# Patient Record
Sex: FEMALE | Race: BLACK OR AFRICAN AMERICAN | NOT HISPANIC OR LATINO | URBAN - METROPOLITAN AREA
[De-identification: names, ages, dates, MRNs, and addresses within clinical notes are randomized per-mention and may not be internally consistent; named-entity substitution may affect disease eponyms.]

---

## 2022-09-10 ENCOUNTER — INPATIENT (INPATIENT)
Facility: HOSPITAL | Age: 57
LOS: 2 days | Discharge: HOME | End: 2022-09-13
Attending: INTERNAL MEDICINE | Admitting: INTERNAL MEDICINE

## 2022-09-10 VITALS
TEMPERATURE: 98 F | SYSTOLIC BLOOD PRESSURE: 194 MMHG | WEIGHT: 110.01 LBS | HEIGHT: 62 IN | DIASTOLIC BLOOD PRESSURE: 111 MMHG | RESPIRATION RATE: 16 BRPM | HEART RATE: 136 BPM | OXYGEN SATURATION: 98 %

## 2022-09-10 DIAGNOSIS — E87.70 FLUID OVERLOAD, UNSPECIFIED: ICD-10-CM

## 2022-09-10 DIAGNOSIS — Z98.890 OTHER SPECIFIED POSTPROCEDURAL STATES: ICD-10-CM

## 2022-09-10 DIAGNOSIS — Z91.15 PATIENT'S NONCOMPLIANCE WITH RENAL DIALYSIS: ICD-10-CM

## 2022-09-10 DIAGNOSIS — Z88.8 ALLERGY STATUS TO OTHER DRUGS, MEDICAMENTS AND BIOLOGICAL SUBSTANCES STATUS: ICD-10-CM

## 2022-09-10 LAB
ALBUMIN SERPL ELPH-MCNC: 2.9 G/DL — LOW (ref 3.3–5)
ALP SERPL-CCNC: 180 U/L — HIGH (ref 40–120)
ALT FLD-CCNC: 10 U/L — LOW (ref 12–78)
ANION GAP SERPL CALC-SCNC: 14 MMOL/L — SIGNIFICANT CHANGE UP (ref 5–17)
APTT BLD: 26.9 SEC — LOW (ref 27.5–35.5)
AST SERPL-CCNC: 78 U/L — HIGH (ref 15–37)
BASE EXCESS BLDA CALC-SCNC: -7.9 MMOL/L — LOW (ref -2–3)
BASOPHILS # BLD AUTO: 0.05 K/UL — SIGNIFICANT CHANGE UP (ref 0–0.2)
BASOPHILS NFR BLD AUTO: 0.5 % — SIGNIFICANT CHANGE UP (ref 0–2)
BILIRUB SERPL-MCNC: 0.8 MG/DL — SIGNIFICANT CHANGE UP (ref 0.2–1.2)
BLOOD GAS COMMENTS ARTERIAL: SIGNIFICANT CHANGE UP
BUN SERPL-MCNC: 60 MG/DL — HIGH (ref 7–23)
CALCIUM SERPL-MCNC: 7.7 MG/DL — LOW (ref 8.5–10.1)
CHLORIDE SERPL-SCNC: 110 MMOL/L — HIGH (ref 96–108)
CK MB BLD-MCNC: 3.8 % — HIGH (ref 0–3.5)
CK MB CFR SERPL CALC: 2.9 NG/ML — SIGNIFICANT CHANGE UP (ref 0.5–3.6)
CK SERPL-CCNC: 77 U/L — SIGNIFICANT CHANGE UP (ref 26–192)
CO2 BLDA-SCNC: 18 MMOL/L — LOW (ref 19–24)
CO2 SERPL-SCNC: 15 MMOL/L — LOW (ref 22–31)
CREAT SERPL-MCNC: 3.95 MG/DL — HIGH (ref 0.5–1.3)
EGFR: 12 ML/MIN/1.73M2 — LOW
EOSINOPHIL # BLD AUTO: 0.17 K/UL — SIGNIFICANT CHANGE UP (ref 0–0.5)
EOSINOPHIL NFR BLD AUTO: 1.8 % — SIGNIFICANT CHANGE UP (ref 0–6)
FLUAV AG NPH QL: SIGNIFICANT CHANGE UP
FLUBV AG NPH QL: SIGNIFICANT CHANGE UP
GAS PNL BLDA: SIGNIFICANT CHANGE UP
GLUCOSE SERPL-MCNC: 274 MG/DL — HIGH (ref 70–99)
HCO3 BLDA-SCNC: 17 MMOL/L — LOW (ref 21–28)
HCT VFR BLD CALC: 41 % — SIGNIFICANT CHANGE UP (ref 34.5–45)
HGB BLD-MCNC: 12.7 G/DL — SIGNIFICANT CHANGE UP (ref 11.5–15.5)
HOROWITZ INDEX BLDA+IHG-RTO: 0.5 — SIGNIFICANT CHANGE UP
IMM GRANULOCYTES NFR BLD AUTO: 1.9 % — HIGH (ref 0–1.5)
INR BLD: 1 RATIO — SIGNIFICANT CHANGE UP (ref 0.88–1.16)
LYMPHOCYTES # BLD AUTO: 2.11 K/UL — SIGNIFICANT CHANGE UP (ref 1–3.3)
LYMPHOCYTES # BLD AUTO: 22.1 % — SIGNIFICANT CHANGE UP (ref 13–44)
MAGNESIUM SERPL-MCNC: 2.5 MG/DL — SIGNIFICANT CHANGE UP (ref 1.6–2.6)
MCHC RBC-ENTMCNC: 31 G/DL — LOW (ref 32–36)
MCHC RBC-ENTMCNC: 35.7 PG — HIGH (ref 27–34)
MCV RBC AUTO: 115.2 FL — HIGH (ref 80–100)
MONOCYTES # BLD AUTO: 0.38 K/UL — SIGNIFICANT CHANGE UP (ref 0–0.9)
MONOCYTES NFR BLD AUTO: 4 % — SIGNIFICANT CHANGE UP (ref 2–14)
NEUTROPHILS # BLD AUTO: 6.64 K/UL — SIGNIFICANT CHANGE UP (ref 1.8–7.4)
NEUTROPHILS NFR BLD AUTO: 69.7 % — SIGNIFICANT CHANGE UP (ref 43–77)
NRBC # BLD: 0 /100 WBCS — SIGNIFICANT CHANGE UP (ref 0–0)
NT-PROBNP SERPL-SCNC: HIGH PG/ML (ref 0–125)
PCO2 BLDA: 31 MMHG — LOW (ref 32–46)
PH BLDA: 7.34 — LOW (ref 7.35–7.45)
PLATELET # BLD AUTO: 126 K/UL — LOW (ref 150–400)
PO2 BLDA: 122 MMHG — HIGH (ref 83–108)
POTASSIUM SERPL-MCNC: 4.4 MMOL/L — SIGNIFICANT CHANGE UP (ref 3.5–5.3)
POTASSIUM SERPL-SCNC: 4.4 MMOL/L — SIGNIFICANT CHANGE UP (ref 3.5–5.3)
PROT SERPL-MCNC: 7.4 GM/DL — SIGNIFICANT CHANGE UP (ref 6–8.3)
PROTHROM AB SERPL-ACNC: 11.9 SEC — SIGNIFICANT CHANGE UP (ref 10.5–13.4)
RBC # BLD: 3.56 M/UL — LOW (ref 3.8–5.2)
RBC # FLD: 16.9 % — HIGH (ref 10.3–14.5)
SAO2 % BLDA: 99.4 % — HIGH (ref 94–98)
SARS-COV-2 RNA SPEC QL NAA+PROBE: SIGNIFICANT CHANGE UP
SODIUM SERPL-SCNC: 139 MMOL/L — SIGNIFICANT CHANGE UP (ref 135–145)
TROPONIN I, HIGH SENSITIVITY RESULT: 69 NG/L — HIGH
WBC # BLD: 9.53 K/UL — SIGNIFICANT CHANGE UP (ref 3.8–10.5)
WBC # FLD AUTO: 9.53 K/UL — SIGNIFICANT CHANGE UP (ref 3.8–10.5)

## 2022-09-10 PROCEDURE — 99285 EMERGENCY DEPT VISIT HI MDM: CPT | Mod: FS,CS

## 2022-09-10 PROCEDURE — 93010 ELECTROCARDIOGRAM REPORT: CPT | Mod: 76

## 2022-09-10 PROCEDURE — 71045 X-RAY EXAM CHEST 1 VIEW: CPT | Mod: 26

## 2022-09-10 PROCEDURE — 99291 CRITICAL CARE FIRST HOUR: CPT | Mod: FS

## 2022-09-10 RX ORDER — HYDROMORPHONE HYDROCHLORIDE 2 MG/ML
0.25 INJECTION INTRAMUSCULAR; INTRAVENOUS; SUBCUTANEOUS ONCE
Refills: 0 | Status: DISCONTINUED | OUTPATIENT
Start: 2022-09-10 | End: 2022-09-11

## 2022-09-10 RX ORDER — CHLORHEXIDINE GLUCONATE 213 G/1000ML
1 SOLUTION TOPICAL
Refills: 0 | Status: DISCONTINUED | OUTPATIENT
Start: 2022-09-10 | End: 2022-09-13

## 2022-09-10 RX ORDER — NITROGLYCERIN 6.5 MG
0.4 CAPSULE, EXTENDED RELEASE ORAL
Refills: 0 | Status: COMPLETED | OUTPATIENT
Start: 2022-09-10 | End: 2022-09-10

## 2022-09-10 RX ORDER — FUROSEMIDE 40 MG
80 TABLET ORAL ONCE
Refills: 0 | Status: COMPLETED | OUTPATIENT
Start: 2022-09-10 | End: 2022-09-10

## 2022-09-10 RX ADMIN — Medication 80 MILLIGRAM(S): at 19:04

## 2022-09-10 RX ADMIN — Medication 0.4 MILLIGRAM(S): at 19:11

## 2022-09-10 RX ADMIN — Medication 0.4 MILLIGRAM(S): at 19:04

## 2022-09-10 NOTE — H&P ADULT - NSHPLABSRESULTS_GEN_ALL_CORE
CBC Full  -  ( 10 Sep 2022 20:16 )  WBC Count : 9.53 K/uL  RBC Count : 3.56 M/uL  Hemoglobin : 12.7 g/dL  Hematocrit : 41.0 %  Platelet Count - Automated : 126 K/uL  Mean Cell Volume : 115.2 fl  Mean Cell Hemoglobin : 35.7 pg  Mean Cell Hemoglobin Concentration : 31.0 g/dL  Auto Neutrophil # : 6.64 K/uL  Auto Lymphocyte # : 2.11 K/uL  Auto Monocyte # : 0.38 K/uL  Auto Eosinophil # : 0.17 K/uL  Auto Basophil # : 0.05 K/uL  Auto Neutrophil % : 69.7 %  Auto Lymphocyte % : 22.1 %  Auto Monocyte % : 4.0 %  Auto Eosinophil % : 1.8 %  Auto Basophil % : 0.5 %      09-10    139  |  110<H>  |  60<H>  ----------------------------<  274<H>  4.4   |  15<L>  |  3.95<H>    Ca    7.7<L>      10 Sep 2022 20:16  Mg     2.5     09-10    TPro  7.4  /  Alb  2.9<L>  /  TBili  0.8  /  DBili  x   /  AST  78<H>  /  ALT  10<L>  /  AlkPhos  180<H>  09-10    LIVER FUNCTIONS - ( 10 Sep 2022 20:16 )  Alb: 2.9 g/dL / Pro: 7.4 gm/dL / ALK PHOS: 180 U/L / ALT: 10 U/L / AST: 78 U/L / GGT: x             PT/INR - ( 10 Sep 2022 20:16 )   PT: 11.9 sec;   INR: 1.00 ratio    PTT - ( 10 Sep 2022 20:16 )  PTT:26.9 sec      ABG - ( 10 Sep 2022 22:12 )  pH, Arterial: 7.34  pH, Blood: x     /  pCO2: 31    /  pO2: 122   / HCO3: 17    / Base Excess: -7.9  /  SaO2: 99.4        Serum Pro-Brain Natriuretic Peptide (09.10.22 @ 20:16)    Serum Pro-Brain Natriuretic Peptide: 50720 pg/mL      CARDIAC MARKERS ( 10 Sep 2022 20:16 )  x     / x     / 77 U/L / x     / 2.9 ng/mL          RADIOLOGY  CXR  my read, b/l opacities mostly at bases given clinical scenario appears to be fluid in etiology

## 2022-09-10 NOTE — H&P ADULT - NS ATTEND AMEND GEN_ALL_CORE FT
57 year old female with a PMH of CAD s/p PCI with stents, MI, CHF, HTN, lupus, ESRD on HD (m/w/f - L AVF) and s/p right meniscus tear repair friday presenting to ICU for urgent HD given fluid volume overload, HF, HTN, renal failure, elevated troponin, and metabolic acidosis.   By the time is saw the patient she is at her baseline and no longer critically ill.  S/p HD, with resolving trop leak and on 2l NC with no volume overload.     - repeat trops and stop hep gtt if down trending if not  will call cards.   - follow up bmp  - restart home prednisone 10  - restart billinta for cad once of hep gtt    - FS pre meals and qhs   - DOwn grade to medicine no ICU needs.

## 2022-09-10 NOTE — ED PROVIDER NOTE - NS ED ROS FT
CONSTITUTIONAL: No fever, no chills, no fatigue  EYES: No visual changes  ENT: No ear pain, no sore throat  CARDIOVASCULAR:  no palpitations  RESPIRATORY: No cough.   GI: No abdominal pain, no nausea, no vomiting, no constipation, no diarrhea  GENITOURINARY: No dysuria, no frequency, no hematuria  MUSKULOSKELETAL: No backpain, no joint pain, no myalgias  SKIN: No rash  NEURO: No headache    ALL OTHER SYSTEMS NEGATIVE.

## 2022-09-10 NOTE — ED PROVIDER NOTE - OBJECTIVE STATEMENT
56 y/o female with Lupus, CAD with stents, history MI, HTN, ESRD M,W,F s/p right knee meniscus yesterday presents with dyspnea today after getting off the phone. Pt missed dialysis  , last dialysis was wednesday. Reports tightness in the chest. Denies fever, cough, vomiting, abdominal pain. 56 y/o female with Lupus, CAD with stents, history MI, HTN, ESRD M,W,F s/p right knee meniscus yesterday presents with dyspnea today after getting off the phone. Pt missed dialysis  , last dialysis was Wednesday. Reports tightness in the chest. Denies fever, cough, vomiting, abdominal pain. 58 y/o female with Lupus, CAD with stents, history MI, HTN, ESRD M,W,F s/p right knee meniscus yesterday presents with dyspnea today after arguing with someone on the  phone. Pt missed dialysis  yesterday, last dialysis was Wednesday. Reports tightness in the chest. Denies fever, cough, vomiting, abdominal pain.

## 2022-09-10 NOTE — ED PROVIDER NOTE - NS ED ATTENDING STATEMENT MOD
Attending Only This was a shared visit with the AVERY. I reviewed and verified the documentation and independently performed the documented:

## 2022-09-10 NOTE — ED PROVIDER NOTE - ATTENDING APP SHARED VISIT CONTRIBUTION OF CARE
I have personally seen and examined this pt I have fully participated in the care of this pt. I have made amendments to the documentation where appropriate and otherwise hx, exam, and plan as documented by the ACP. Pt's labs cxt and care were singed out to Dr. Johnson.

## 2022-09-10 NOTE — ED ADULT NURSE NOTE - OBJECTIVE STATEMENT
Pt BIBA for shortness of breath since 5:30am . pt had meniscus tear surgery done yesterday . dialysis was due yesterday and she missed due to surgery . Pt presents with labored breathing, using accessory muscles, and diaphoretic. Pt refuses to speak because she feels so short of breath. MD at bedside, and repiratory to set up BiPAP.  PMH: renal failure , htn, MI times three with stents , lupus .

## 2022-09-10 NOTE — ED PROVIDER NOTE - PHYSICAL EXAMINATION
GEN: Awake, alert, interactive, Respiratory distress , tachypneic.   HEAD AND NECK: NC/AT. Airway patent. Neck supple.   EYES:  Clear b/l. EOMI. PERRL.   ENT: Moist mucus membranes.   CARDIAC: Regular rate, regular rhythm.    RESP/CHEST: Normal respiratory effort with no use of accessory muscles or retractions. (+) bilateral crackles   ABD: soft, non-distended, non-tender. No rebound, no guarding.   BACK: No midline spinal TTP. No CVAT.   EXTREMITIES: (+) ace bandage to the RLE.  Moving all extremities with no apparent deformities.   SKIN: Warm, dry, intact normal color. No rash.   NEURO: AOx3, CN II-XII grossly intact, no focal deficits.   PSYCH: Appropriate mood and affect.

## 2022-09-10 NOTE — ED ADULT NURSE NOTE - NSIMPLEMENTINTERV_GEN_ALL_ED
Implemented All Fall Risk Interventions:  [place] to call system. Call bell, personal items and telephone within reach. Instruct patient to call for assistance. Room bathroom lighting operational. Non-slip footwear when patient is off stretcher. Physically safe environment: no spills, clutter or unnecessary equipment. Stretcher in lowest position, wheels locked, appropriate side rails in place. Provide visual cue, wrist band, yellow gown, etc. Monitor gait and stability. Monitor for mental status changes and reorient to person, place, and time. Review medications for side effects contributing to fall risk. Reinforce activity limits and safety measures with patient and family.

## 2022-09-10 NOTE — H&P ADULT - NSHPPHYSICALEXAM_GEN_ALL_CORE
CONSTITUTIONAL: Well groomed, appears stated age, NIV in place  EYES: PERRLA and symmetric, EOMI  RESP: Increased WOB noted, b/l crackles at lower bases, no use of accessory muscles noted, equal chest expansion   CV: 2+ pulses in extremities x4, + cap refill, no major edema to extremities noted  GI: Soft, NT, ND, no rebound, no guarding  MSK: BURGESS x4  EXT: right knee surgical incision with ace wrap over it; LUE AV fistula noted  SKIN: No rashes or ulcers noted; no subcutaneous nodules or induration palpable  NEURO: CN II-XII intact; normal reflexes in upper and lower extremities, sensation intact in upper and lower extremities b/l to light touch   PSYCH: Appropriate insight/judgment; A+O x 3, mood and affect appropriate, recent/remote memory intact

## 2022-09-10 NOTE — H&P ADULT - HISTORY OF PRESENT ILLNESS
Ms. Aponte is a  Ms. Aponte is a 57 year old female with a PMH of CAD s/p PCI with stents, MI, CHF, HTN, ESRD on HD (m/w/f) who presented to [facility] earlier this AM with c/o dyspnea and chest tightness s/p missed HD yesterday. Reportedly pt also undergoing stress at home. In ED pt noted to be in fluid overload, with improvement noted in breathing with placement of NIV and Lasix IVP (patient has urine output). Nephrology consulted with plans for emergent HD tonight. ICU team consulted.  Ms. Aponte is a 57 year old female with a PMH of CAD s/p PCI with stents, MI, CHF, HTN, lupus, ESRD on HD (m/w/f) and right meniscus tear repair yesterday who presented to [facility] earlier this AM with c/o dyspnea and chest tightness s/p missed HD yesterday. Reportedly pt also undergoing stress at home and was just in argument with her grandson. In ED pt noted to be in fluid overload, with improvement noted in breathing with placement of NIV and Lasix IVP (patient has urine output). Nephrology consulted with plans for emergent HD tonight. ICU team consulted.

## 2022-09-10 NOTE — PROVIDER CONTACT NOTE (EICU) - ASSESSMENT
58 yo F with history as above presented with respiratory distress due to volume overload from missed HD session

## 2022-09-10 NOTE — PROVIDER CONTACT NOTE (EICU) - RECOMMENDATIONS
-admit to ICU   -HD tonight as per nephrology   -continue Bipap, wean as tolerated, hopefully to be able to come off after HD session tonight   -repeat troponin, initial level mildly elevated, may be due to ERSD  -MCV elevated, would check B12 and folate   -discussed with CC NP Mk  -teleICU team to evaluate patient using [employer] two-way AV technology once in ICU bed

## 2022-09-10 NOTE — ED PROVIDER NOTE - NSICDXPASTMEDICALHX_GEN_ALL_CORE_FT
PAST MEDICAL HISTORY:  CAD (coronary artery disease)     End stage renal disease     Hypertension     Lupus

## 2022-09-10 NOTE — H&P ADULT - NSHPSOCIALHISTORY_GEN_ALL_CORE
Patient lives with her son, Storm, currently at his house and she does not work she is on disability. She is single, not . No etoh, drug or tobacco use.

## 2022-09-10 NOTE — CONSULT NOTE ADULT - SUBJECTIVE AND OBJECTIVE BOX
HPI:  Patient is a 57y old  Female with ESRD on HD MWF in [facility] who presented to ED with acute onset dyspnea after missing outpt HD yest due to R knee orthoscopic meniscal repair.   CXR revealed pulm edema. Pt laced on BiPAP. STAT renal evaluation requested to arrange for HD ASAP.       PAST MEDICAL & SURGICAL HISTORY:  End stage renal disease  Hypertension  Lupus  CAD (coronary artery disease)                Allergies    Dilantin (Rash)  erythromycin (Rash)    Intolerances        MEDICATIONS  (STANDING):  chlorhexidine 2% Cloths 1 Application(s) Topical <User Schedule>    MEDICATIONS  (PRN):      Daily Height in cm: 157.48 (10 Sep 2022 18:24)    Daily     Drug Dosing Weight  Height (cm): 157.5 (10 Sep 2022 18:24)  Weight (kg): 49.9 (10 Sep 2022 18:24)  BMI (kg/m2): 20.1 (10 Sep 2022 18:24)  BSA (m2): 1.48 (10 Sep 2022 18:24)      REVIEW OF SYSTEMS:    Dyspnea  ESRD        ABG - ( 10 Sep 2022 22:12 )  pH, Arterial: 7.34  pH, Blood: x     /  pCO2: 31    /  pO2: 122   / HCO3: 17    / Base Excess: -7.9  /  SaO2: 99.4                I&O's Detail        PHYSICAL EXAM:    GENERAL: dyspneic, on BiPAP  HEAD:  Atraumatic, normocephalic  CHEST/LUNG: crackles  HEART: Regular rate and rhythm. No murmurs, rubs, or gallops  ABDOMEN: Soft, Nontender, Nondistended. POS BS  EXTREMITIES:  R leg edema, knee dressed No edema on L.     LABS:  CBC Full  -  ( 10 Sep 2022 20:16 )  WBC Count : 9.53 K/uL  RBC Count : 3.56 M/uL  Hemoglobin : 12.7 g/dL  Hematocrit : 41.0 %  Platelet Count - Automated : 126 K/uL  Mean Cell Volume : 115.2 fl  Mean Cell Hemoglobin : 35.7 pg  Mean Cell Hemoglobin Concentration : 31.0 g/dL  Auto Neutrophil # : 6.64 K/uL  Auto Lymphocyte # : 2.11 K/uL  Auto Monocyte # : 0.38 K/uL  Auto Eosinophil # : 0.17 K/uL  Auto Basophil # : 0.05 K/uL  Auto Neutrophil % : 69.7 %  Auto Lymphocyte % : 22.1 %  Auto Monocyte % : 4.0 %  Auto Eosinophil % : 1.8 %  Auto Basophil % : 0.5 %    09-10    139  |  110<H>  |  60<H>  ----------------------------<  274<H>  4.4   |  15<L>  |  3.95<H>    Ca    7.7<L>      10 Sep 2022 20:16  Mg     2.5     09-10    TPro  7.4  /  Alb  2.9<L>  /  TBili  0.8  /  DBili  x   /  AST  78<H>  /  ALT  10<L>  /  AlkPhos  180<H>  09-10    CAPILLARY BLOOD GLUCOSE        PT/INR - ( 10 Sep 2022 20:16 )   PT: 11.9 sec;   INR: 1.00 ratio         PTT - ( 10 Sep 2022 20:16 )  PTT:26.9 sec    CARDIAC MARKERS ( 10 Sep 2022 20:16 )  x     / x     / 77 U/L / x     / 2.9 ng/mL        Impression:  * HD dependent ESRD. Dialyzes MWF in [facility]  * Pulm edema  * Hypertensive urgency  * SLE    Recommendations:   * Urgent HD ordered for tonight with max UF as tolerated. HD nurse informed at 10pm  * Pt will need to be moved to ICU level for dialysis and over close monitoring.   * Will reeval in 12h  * Follow cardiac enzymes  * Obtain Echo to exclude LV dysfx, valvular dz    Thank you!

## 2022-09-10 NOTE — H&P ADULT - ASSESSMENT
57 year old female with a PMH of CAD s/p PCI with stents, MI, CHF, HTN, and ESRD on HD (m/w/f) presenting to ICU for urgent HD given fluid volume overload, HF, HTN, renal failure, and metabolic acidosis. Plan as below: 57 year old female with a PMH of CAD s/p PCI with stents, MI, CHF, HTN, lupus, ESRD on HD (m/w/f) and s/p right meniscus tear repair yesterday presenting to ICU for urgent HD given fluid volume overload, HF, HTN, renal failure, and metabolic acidosis. Plan as below: 57 year old female with a PMH of CAD s/p PCI with stents, MI, CHF, HTN, lupus, ESRD on HD (m/w/f - L AVF) and s/p right meniscus tear repair yesterday presenting to ICU for urgent HD given fluid volume overload, HF, HTN, renal failure, elevated troponin, and metabolic acidosis. Plan as below:    --admit to ICU, monitor vitals, o2 sat, neuro status, tele rhythm  --maintain NIV as tolerated, hopefully pt will no longer require once HD is completed  --nitro sl if needed; PO antihypertensives  --trend labs, including cardiac enzymes  --emergent hd per nephrologist via left AV fistula  --NPO for now  --dvt prophylaxis  --tte  --pain control for right knee meniscus repair  --pt / family education  --?steroid for lupus? 57 year old female with a PMH of CAD s/p PCI with stents, MI, CHF, HTN, lupus, ESRD on HD (m/w/f - L AVF) and s/p right meniscus tear repair yesterday presenting to ICU for urgent HD given fluid volume overload, HF, HTN, renal failure, elevated troponin, and metabolic acidosis. Plan as below:    --admit to ICU, monitor vitals, o2 sat, neuro status, tele rhythm  --maintain NIV as tolerated, hopefully pt will no longer require once HD is completed  --nitro sl if needed; PO antihypertensives (home bb)  --trend labs, including cardiac enzymes; elevated MCV sending b12/folate  --emergent hd per nephrologist via left AV fistula  --NPO for now  --dvt prophylaxis  --tte  --pain control for right knee meniscus repair  --pt / family education  --?steroid for lupus?      d/w eicu attending MD   57 year old female with a PMH of CAD s/p PCI with stents, MI, CHF, HTN, lupus, ESRD on HD (m/w/f - L AVF) and s/p right meniscus tear repair yesterday presenting to ICU for urgent HD given fluid volume overload, HF, HTN, renal failure, elevated troponin, and metabolic acidosis. Plan as below:    --admit to ICU, monitor vitals, o2 sat, neuro status, tele rhythm  --maintain NIV as tolerated, hopefully pt will no longer require once HD is completed  --nitro sl if needed; PO antihypertensives (home bb)  --cardiac enzymes, trend and 12 lead ekg no st seg changes noted; asa / brilinta home meds  --trend labs; elevated MCV sending b12/folate  --emergent hd per nephrologist via left AV fistula; monitor u/o, lasix ivp  --NPO for now  --dvt prophylaxis  --tte  --pain control for right knee meniscus repair  --pt / family education  --?steroid for lupus?      d/w eicu attending MD

## 2022-09-10 NOTE — ED PROVIDER NOTE - CLINICAL SUMMARY MEDICAL DECISION MAKING FREE TEXT BOX
58 y/o female with history cad with stents, MI, lupus, ESRD MWF s/p menicus surgery here with dyspnea in respiratory distress. Will get labs, ekg, cxr, bipap, nitro , lasix ordered. 58 y/o female with history cad with stents, MI, lupus, ESRD MWF s/p menicus surgery here with dyspnea in respiratory distress. Will get labs, ekg, cxr, bipap, nitro , lasix ordered and noted urine output in ED    Dr Duron aware, will admit for tele, Dialysis pending. respiratory status significantly improved with bipap and lasix, pt xray noted pulmonary edema moderate bilaterally. 58 y/o female with history cad with stents, MI, lupus, ESRD MWF s/p menicus surgery here with dyspnea in respiratory distress. Will get labs, ekg, cxr, bipap, nitro , lasix ordered and noted urine output in the ED.   Labs reviewed K normal. Pt with elevated probnp and mild elevated troponin likely related to renal disease and chf. EKG not ischemic.     Dr Duron aware, will admit for tele, Dialysis pending. respiratory status significantly improved with bipap and lasix, pt xray noted pulmonary edema moderate bilaterally.

## 2022-09-10 NOTE — ED ADULT NURSE NOTE - ED STAT RN HANDOFF DETAILS
assumed care for pt at this time. Pt is Alert and in distress. PA and Respiratory is at bedside. assumed care for pt at this time. Pt is Alert and in distress. PA and Respiratory is at bedside.    @[id number] reported called Mell rose. pt is A&Ox4 in NAD

## 2022-09-10 NOTE — ED ADULT TRIAGE NOTE - CHIEF COMPLAINT QUOTE
brought by ems for shortness of breath since 5:30am . pt had meniscus tear surgery done yesterday . dialysis was due yesterday and she missed due to surgery . h/o renal failure , htn, MI times three with stents , lupus .

## 2022-09-10 NOTE — PROVIDER CONTACT NOTE (EICU) - BACKGROUND
58 yo F with Lupus, CAD with stents, history MI, HTN, ESRD M,W,F presented with dyspnea this evening after arguing with someone on the phone. Last HD on Wednesday, missed yesterday's session. Exam with crackles bilaterally, started on Bipap in ED, given 80 mg lasix IV with some diuresis. Also received nitro. Initial HR in 140s (sinus tach), improved to 120s, EKG per report no st elevations, left anterior fascicular block t wave inversion in v6, v1avl, Troponin 69, pro-BNP 20,795. Seen by nephrology with plans for HD tonight

## 2022-09-11 LAB
ALBUMIN SERPL ELPH-MCNC: 3 G/DL — LOW (ref 3.3–5)
ALP SERPL-CCNC: 139 U/L — HIGH (ref 40–120)
ALT FLD-CCNC: 8 U/L — LOW (ref 12–78)
ANION GAP SERPL CALC-SCNC: 14 MMOL/L — SIGNIFICANT CHANGE UP (ref 5–17)
APTT BLD: 27.7 SEC — SIGNIFICANT CHANGE UP (ref 27.5–35.5)
APTT BLD: 57.7 SEC — HIGH (ref 27.5–35.5)
AST SERPL-CCNC: 37 U/L — SIGNIFICANT CHANGE UP (ref 15–37)
BILIRUB SERPL-MCNC: 0.9 MG/DL — SIGNIFICANT CHANGE UP (ref 0.2–1.2)
BUN SERPL-MCNC: 50 MG/DL — HIGH (ref 7–23)
CALCIUM SERPL-MCNC: 8.3 MG/DL — LOW (ref 8.5–10.1)
CHLORIDE SERPL-SCNC: 105 MMOL/L — SIGNIFICANT CHANGE UP (ref 96–108)
CK MB BLD-MCNC: 6.4 % — HIGH (ref 0–3.5)
CK MB BLD-MCNC: 7.6 % — HIGH (ref 0–3.5)
CK MB CFR SERPL CALC: 6.4 NG/ML — HIGH (ref 0.5–3.6)
CK MB CFR SERPL CALC: 7.3 NG/ML — HIGH (ref 0.5–3.6)
CK SERPL-CCNC: 100 U/L — SIGNIFICANT CHANGE UP (ref 26–192)
CK SERPL-CCNC: 96 U/L — SIGNIFICANT CHANGE UP (ref 26–192)
CO2 SERPL-SCNC: 19 MMOL/L — LOW (ref 22–31)
CREAT SERPL-MCNC: 3.33 MG/DL — HIGH (ref 0.5–1.3)
EGFR: 15 ML/MIN/1.73M2 — LOW
FOLATE SERPL-MCNC: 14.7 NG/ML — SIGNIFICANT CHANGE UP
GLUCOSE SERPL-MCNC: 81 MG/DL — SIGNIFICANT CHANGE UP (ref 70–99)
HCT VFR BLD CALC: 47.5 % — HIGH (ref 34.5–45)
HGB BLD-MCNC: 15.7 G/DL — HIGH (ref 11.5–15.5)
MCHC RBC-ENTMCNC: 33.1 G/DL — SIGNIFICANT CHANGE UP (ref 32–36)
MCHC RBC-ENTMCNC: 34.7 PG — HIGH (ref 27–34)
MCV RBC AUTO: 104.9 FL — HIGH (ref 80–100)
NRBC # BLD: 0 /100 WBCS — SIGNIFICANT CHANGE UP (ref 0–0)
PLATELET # BLD AUTO: 144 K/UL — LOW (ref 150–400)
POTASSIUM SERPL-MCNC: 5.6 MMOL/L — HIGH (ref 3.5–5.3)
POTASSIUM SERPL-SCNC: 5.6 MMOL/L — HIGH (ref 3.5–5.3)
PROT SERPL-MCNC: 7.3 GM/DL — SIGNIFICANT CHANGE UP (ref 6–8.3)
RBC # BLD: 4.53 M/UL — SIGNIFICANT CHANGE UP (ref 3.8–5.2)
RBC # FLD: 16.6 % — HIGH (ref 10.3–14.5)
SODIUM SERPL-SCNC: 138 MMOL/L — SIGNIFICANT CHANGE UP (ref 135–145)
TROPONIN I, HIGH SENSITIVITY RESULT: 365.9 NG/L — HIGH
TROPONIN I, HIGH SENSITIVITY RESULT: 429.8 NG/L — HIGH
VIT B12 SERPL-MCNC: 1081 PG/ML — SIGNIFICANT CHANGE UP (ref 232–1245)
WBC # BLD: 13.68 K/UL — HIGH (ref 3.8–10.5)
WBC # FLD AUTO: 13.68 K/UL — HIGH (ref 3.8–10.5)

## 2022-09-11 RX ORDER — OXYCODONE AND ACETAMINOPHEN 5; 325 MG/1; MG/1
1 TABLET ORAL ONCE
Refills: 0 | Status: DISCONTINUED | OUTPATIENT
Start: 2022-09-11 | End: 2022-09-11

## 2022-09-11 RX ORDER — HEPARIN SODIUM 5000 [USP'U]/ML
5000 INJECTION INTRAVENOUS; SUBCUTANEOUS EVERY 8 HOURS
Refills: 0 | Status: DISCONTINUED | OUTPATIENT
Start: 2022-09-11 | End: 2022-09-11

## 2022-09-11 RX ORDER — HEPARIN SODIUM 5000 [USP'U]/ML
3200 INJECTION INTRAVENOUS; SUBCUTANEOUS EVERY 6 HOURS
Refills: 0 | Status: DISCONTINUED | OUTPATIENT
Start: 2022-09-11 | End: 2022-09-11

## 2022-09-11 RX ORDER — HYDROMORPHONE HYDROCHLORIDE 2 MG/ML
0.25 INJECTION INTRAMUSCULAR; INTRAVENOUS; SUBCUTANEOUS ONCE
Refills: 0 | Status: DISCONTINUED | OUTPATIENT
Start: 2022-09-11 | End: 2022-09-11

## 2022-09-11 RX ORDER — HEPARIN SODIUM 5000 [USP'U]/ML
3200 INJECTION INTRAVENOUS; SUBCUTANEOUS ONCE
Refills: 0 | Status: COMPLETED | OUTPATIENT
Start: 2022-09-11 | End: 2022-09-11

## 2022-09-11 RX ORDER — ASPIRIN/CALCIUM CARB/MAGNESIUM 324 MG
81 TABLET ORAL DAILY
Refills: 0 | Status: DISCONTINUED | OUTPATIENT
Start: 2022-09-11 | End: 2022-09-13

## 2022-09-11 RX ORDER — METOPROLOL TARTRATE 50 MG
25 TABLET ORAL
Refills: 0 | Status: DISCONTINUED | OUTPATIENT
Start: 2022-09-11 | End: 2022-09-13

## 2022-09-11 RX ORDER — HEPARIN SODIUM 5000 [USP'U]/ML
INJECTION INTRAVENOUS; SUBCUTANEOUS
Qty: 25000 | Refills: 0 | Status: DISCONTINUED | OUTPATIENT
Start: 2022-09-11 | End: 2022-09-11

## 2022-09-11 RX ADMIN — CHLORHEXIDINE GLUCONATE 1 APPLICATION(S): 213 SOLUTION TOPICAL at 06:35

## 2022-09-11 RX ADMIN — Medication 10 MILLIGRAM(S): at 11:53

## 2022-09-11 RX ADMIN — HEPARIN SODIUM 650 UNIT(S)/HR: 5000 INJECTION INTRAVENOUS; SUBCUTANEOUS at 04:52

## 2022-09-11 RX ADMIN — HYDROMORPHONE HYDROCHLORIDE 0.25 MILLIGRAM(S): 2 INJECTION INTRAMUSCULAR; INTRAVENOUS; SUBCUTANEOUS at 00:47

## 2022-09-11 RX ADMIN — HYDROMORPHONE HYDROCHLORIDE 0.25 MILLIGRAM(S): 2 INJECTION INTRAMUSCULAR; INTRAVENOUS; SUBCUTANEOUS at 05:30

## 2022-09-11 RX ADMIN — HEPARIN SODIUM 650 UNIT(S)/HR: 5000 INJECTION INTRAVENOUS; SUBCUTANEOUS at 11:20

## 2022-09-11 RX ADMIN — HYDROMORPHONE HYDROCHLORIDE 0.25 MILLIGRAM(S): 2 INJECTION INTRAMUSCULAR; INTRAVENOUS; SUBCUTANEOUS at 23:07

## 2022-09-11 RX ADMIN — HYDROMORPHONE HYDROCHLORIDE 0.25 MILLIGRAM(S): 2 INJECTION INTRAMUSCULAR; INTRAVENOUS; SUBCUTANEOUS at 01:10

## 2022-09-11 RX ADMIN — HEPARIN SODIUM 3200 UNIT(S): 5000 INJECTION INTRAVENOUS; SUBCUTANEOUS at 04:52

## 2022-09-11 RX ADMIN — Medication 81 MILLIGRAM(S): at 11:53

## 2022-09-11 RX ADMIN — HEPARIN SODIUM 650 UNIT(S)/HR: 5000 INJECTION INTRAVENOUS; SUBCUTANEOUS at 07:32

## 2022-09-11 RX ADMIN — OXYCODONE AND ACETAMINOPHEN 1 TABLET(S): 5; 325 TABLET ORAL at 17:32

## 2022-09-11 RX ADMIN — Medication 25 MILLIGRAM(S): at 17:36

## 2022-09-11 RX ADMIN — HYDROMORPHONE HYDROCHLORIDE 0.25 MILLIGRAM(S): 2 INJECTION INTRAMUSCULAR; INTRAVENOUS; SUBCUTANEOUS at 05:14

## 2022-09-11 NOTE — CHART NOTE - NSCHARTNOTEFT_GEN_A_CORE
57 year old female with a PMH of CAD s/p PCI with stents, MI, CHF, HTN, lupus, ESRD on HD (m/w/f - L AVF) and s/p right meniscus tear repair friday presenting to ICU for urgent HD given fluid volume overload, HF, HTN, renal failure, elevated troponin, and metabolic acidosis.    S/p HD, with resolving trop leak and on 2l NC with no volume overload.     CAD s/p PCI  Type II NSTEMI  Fluid Overload  ESRD     s/p heparin drip: now angela  restart home prednisone 10  restart billinta for cad    Downgrade to medicine

## 2022-09-12 LAB
ALBUMIN SERPL ELPH-MCNC: 2.9 G/DL — LOW (ref 3.3–5)
ALP SERPL-CCNC: 116 U/L — SIGNIFICANT CHANGE UP (ref 40–120)
ALT FLD-CCNC: <6 U/L — LOW (ref 12–78)
ANION GAP SERPL CALC-SCNC: 10 MMOL/L — SIGNIFICANT CHANGE UP (ref 5–17)
AST SERPL-CCNC: 24 U/L — SIGNIFICANT CHANGE UP (ref 15–37)
BILIRUB SERPL-MCNC: 0.7 MG/DL — SIGNIFICANT CHANGE UP (ref 0.2–1.2)
BUN SERPL-MCNC: 90 MG/DL — HIGH (ref 7–23)
CALCIUM SERPL-MCNC: 8 MG/DL — LOW (ref 8.5–10.1)
CHLORIDE SERPL-SCNC: 107 MMOL/L — SIGNIFICANT CHANGE UP (ref 96–108)
CO2 SERPL-SCNC: 23 MMOL/L — SIGNIFICANT CHANGE UP (ref 22–31)
CREAT SERPL-MCNC: 3.85 MG/DL — HIGH (ref 0.5–1.3)
EGFR: 13 ML/MIN/1.73M2 — LOW
GLUCOSE SERPL-MCNC: 134 MG/DL — HIGH (ref 70–99)
HCT VFR BLD CALC: 33.8 % — LOW (ref 34.5–45)
HGB BLD-MCNC: 10.9 G/DL — LOW (ref 11.5–15.5)
MAGNESIUM SERPL-MCNC: 2.3 MG/DL — SIGNIFICANT CHANGE UP (ref 1.6–2.6)
MCHC RBC-ENTMCNC: 32.2 G/DL — SIGNIFICANT CHANGE UP (ref 32–36)
MCHC RBC-ENTMCNC: 34.6 PG — HIGH (ref 27–34)
MCV RBC AUTO: 107.3 FL — HIGH (ref 80–100)
NRBC # BLD: 0 /100 WBCS — SIGNIFICANT CHANGE UP (ref 0–0)
PHOSPHATE SERPL-MCNC: 4 MG/DL — SIGNIFICANT CHANGE UP (ref 2.5–4.5)
PLATELET # BLD AUTO: 115 K/UL — LOW (ref 150–400)
POTASSIUM SERPL-MCNC: 5.2 MMOL/L — SIGNIFICANT CHANGE UP (ref 3.5–5.3)
POTASSIUM SERPL-SCNC: 5.2 MMOL/L — SIGNIFICANT CHANGE UP (ref 3.5–5.3)
PROT SERPL-MCNC: 6.7 GM/DL — SIGNIFICANT CHANGE UP (ref 6–8.3)
RBC # BLD: 3.15 M/UL — LOW (ref 3.8–5.2)
RBC # FLD: 16.8 % — HIGH (ref 10.3–14.5)
SODIUM SERPL-SCNC: 140 MMOL/L — SIGNIFICANT CHANGE UP (ref 135–145)
WBC # BLD: 8.33 K/UL — SIGNIFICANT CHANGE UP (ref 3.8–10.5)
WBC # FLD AUTO: 8.33 K/UL — SIGNIFICANT CHANGE UP (ref 3.8–10.5)

## 2022-09-12 PROCEDURE — 99233 SBSQ HOSP IP/OBS HIGH 50: CPT

## 2022-09-12 RX ORDER — MORPHINE SULFATE 50 MG/1
2 CAPSULE, EXTENDED RELEASE ORAL EVERY 6 HOURS
Refills: 0 | Status: DISCONTINUED | OUTPATIENT
Start: 2022-09-12 | End: 2022-09-12

## 2022-09-12 RX ORDER — ASPIRIN/CALCIUM CARB/MAGNESIUM 324 MG
0 TABLET ORAL
Qty: 0 | Refills: 0 | DISCHARGE

## 2022-09-12 RX ORDER — HYDROMORPHONE HYDROCHLORIDE 2 MG/ML
1 INJECTION INTRAMUSCULAR; INTRAVENOUS; SUBCUTANEOUS EVERY 6 HOURS
Refills: 0 | Status: DISCONTINUED | OUTPATIENT
Start: 2022-09-12 | End: 2022-09-13

## 2022-09-12 RX ADMIN — CHLORHEXIDINE GLUCONATE 1 APPLICATION(S): 213 SOLUTION TOPICAL at 05:12

## 2022-09-12 RX ADMIN — HYDROMORPHONE HYDROCHLORIDE 0.25 MILLIGRAM(S): 2 INJECTION INTRAMUSCULAR; INTRAVENOUS; SUBCUTANEOUS at 00:06

## 2022-09-12 RX ADMIN — MORPHINE SULFATE 2 MILLIGRAM(S): 50 CAPSULE, EXTENDED RELEASE ORAL at 09:41

## 2022-09-12 RX ADMIN — Medication 81 MILLIGRAM(S): at 14:08

## 2022-09-12 RX ADMIN — Medication 10 MILLIGRAM(S): at 05:12

## 2022-09-12 RX ADMIN — HYDROMORPHONE HYDROCHLORIDE 1 MILLIGRAM(S): 2 INJECTION INTRAMUSCULAR; INTRAVENOUS; SUBCUTANEOUS at 19:01

## 2022-09-12 RX ADMIN — Medication 25 MILLIGRAM(S): at 05:11

## 2022-09-12 NOTE — PROGRESS NOTE ADULT - SUBJECTIVE AND OBJECTIVE BOX
Subjective: much more comfortable since HD/UF last night. On NC O2      MEDICATIONS  (STANDING):  aspirin  chewable 81 milliGRAM(s) Oral daily  chlorhexidine 2% Cloths 1 Application(s) Topical <User Schedule>  heparin  Infusion.  Unit(s)/Hr (6.5 mL/Hr) IV Continuous <Continuous>  metoprolol tartrate 25 milliGRAM(s) Oral two times a day  predniSONE   Tablet 10 milliGRAM(s) Oral daily    MEDICATIONS  (PRN):  heparin   Injectable 3200 Unit(s) IV Push every 6 hours PRN For aPTT less than 40          T(C): 36.7 (09-11-22 @ 07:35), Max: 37.3 (09-10-22 @ 23:00)  HR: 76 (09-11-22 @ 11:00) (75 - 136)  BP: 130/77 (09-11-22 @ 11:00) (93/82 - 196/109)  RR: 15 (09-11-22 @ 11:00) (9 - 32)  SpO2: 100% (09-11-22 @ 11:00) (97% - 100%)  Wt(kg): --    ABG - ( 10 Sep 2022 22:12 )  pH, Arterial: 7.34  pH, Blood: x     /  pCO2: 31    /  pO2: 122   / HCO3: 17    / Base Excess: -7.9  /  SaO2: 99.4                I&O's Detail    10 Sep 2022 07:01  -  11 Sep 2022 07:00  --------------------------------------------------------  IN:    Heparin Infusion: 13 mL  Total IN: 13 mL    OUT:    Incontinent per Collection Bag (mL): 200 mL    Other (mL): 3000 mL  Total OUT: 3200 mL    Total NET: -3187 mL      11 Sep 2022 07:01  -  11 Sep 2022 11:26  --------------------------------------------------------  IN:    Heparin Infusion: 19.5 mL  Total IN: 19.5 mL    OUT:  Total OUT: 0 mL    Total NET: 19.5 mL               PHYSICAL EXAM:    GENERAL: NAD  NECK: Supple, no inc in JVP  CHEST/LUNG: Clear  HEART: S1S2  ABDOMEN: Soft, Nontender, Nondistended; Bowel sounds present  EXTREMITIES:  R knee dressed. No edema on L  L AVG t/b      LABS:  CBC Full  -  ( 11 Sep 2022 03:27 )  WBC Count : 13.68 K/uL  RBC Count : 4.53 M/uL  Hemoglobin : 15.7 g/dL  Hematocrit : 47.5 %  Platelet Count - Automated : 144 K/uL  Mean Cell Volume : 104.9 fl  Mean Cell Hemoglobin : 34.7 pg  Mean Cell Hemoglobin Concentration : 33.1 g/dL  Auto Neutrophil # : x  Auto Lymphocyte # : x  Auto Monocyte # : x  Auto Eosinophil # : x  Auto Basophil # : x  Auto Neutrophil % : x  Auto Lymphocyte % : x  Auto Monocyte % : x  Auto Eosinophil % : x  Auto Basophil % : x    09-10    139  |  110<H>  |  60<H>  ----------------------------<  274<H>  4.4   |  15<L>  |  3.95<H>    Ca    7.7<L>      10 Sep 2022 20:16  Mg     2.5     09-10          Impression:  * HD dependent ESRD. Dialyzes MWF in [facility]  * Pulm edema, better post HD/UF  * Hypertensive urgency  * SLE    Recommendations:   * Not in need of HD today. Will plan next dialysis for tomorrow.    * Will reeval in 12h  * Follow cardiac enzymes  * Obtain Echo to exclude LV dysfx, valvular dz    Thank you!            
[facility] NEPHROLOGY SERVICES, [facility]  NEPHROLOGY AND HYPERTENSION  [address]  [address]  [place] [address]  [phone]    MD JESSICA RAY, MD MATT CRUZ, MD TARA LUCAS, MD TAVO CAT, MD RITA MEEKS, MD LEONA SOTO MD          Patient events noted  No distress    MEDICATIONS  (STANDING):  aspirin  chewable 81 milliGRAM(s) Oral daily  chlorhexidine 2% Cloths 1 Application(s) Topical <User Schedule>  metoprolol tartrate 25 milliGRAM(s) Oral two times a day  predniSONE   Tablet 10 milliGRAM(s) Oral daily    MEDICATIONS  (PRN):  HYDROmorphone  Injectable 1 milliGRAM(s) IV Push every 6 hours PRN Severe Pain (7 - 10)      09-11-22 @ 07:01  -  09-12-22 @ 07:00  --------------------------------------------------------  IN: 376 mL / OUT: 150 mL / NET: 226 mL    09-12-22 @ 07:01  -  09-12-22 @ 22:29  --------------------------------------------------------  IN: 0 mL / OUT: 2000 mL / NET: -2000 mL      PHYSICAL EXAM:      T(C): 36.6 (09-12-22 @ 16:11), Max: 36.9 (09-11-22 @ 23:59)  HR: 70 (09-12-22 @ 16:11) (67 - 86)  BP: 114/71 (09-12-22 @ 16:11) (114/71 - 138/78)  RR: 18 (09-12-22 @ 16:11) (18 - 18)  SpO2: 99% (09-12-22 @ 16:11) (94% - 100%)  Wt(kg): --  Lungs clear  Heart S1S2  Abd soft NT ND  Extremities:   tr edema                                    10.9   8.33  )-----------( 115      ( 12 Sep 2022 12:00 )             33.8     09-12    140  |  107  |  90<H>  ----------------------------<  134<H>  5.2   |  23  |  3.85<H>    Ca    8.0<L>      12 Sep 2022 12:00  Phos  4.0     09-12  Mg     2.3     09-12    TPro  6.7  /  Alb  2.9<L>  /  TBili  0.7  /  DBili  x   /  AST  24  /  ALT  <6<L>  /  AlkPhos  116  09-12      LIVER FUNCTIONS - ( 12 Sep 2022 12:00 )  Alb: 2.9 g/dL / Pro: 6.7 gm/dL / ALK PHOS: 116 U/L / ALT: <6 U/L / AST: 24 U/L / GGT: x           Creatinine Trend: 3.85<--, 3.33<--, 3.95<--      Assessment   ESRD, maintenance     Plan:  HD for today  AV access cannulation  Will follow course.    Juan Darby MD
Patient is a 57y old  Female who presents with a chief complaint of emergent HD, fluid volume overload (12 Sep 2022 13:55)      INTERVAL HPI/ OVERNIGHT EVENTS: Pt was seen and examined at bedside today, No significant overnight events, pt denies any complaints at this time      MEDICATIONS  (STANDING):  aspirin  chewable 81 milliGRAM(s) Oral daily  chlorhexidine 2% Cloths 1 Application(s) Topical <User Schedule>  metoprolol tartrate 25 milliGRAM(s) Oral two times a day  predniSONE   Tablet 10 milliGRAM(s) Oral daily    MEDICATIONS  (PRN):  HYDROmorphone  Injectable 1 milliGRAM(s) IV Push every 6 hours PRN Severe Pain (7 - 10)      Allergies    Dilantin (Rash)  erythromycin (Rash)    Intolerances        REVIEW OF SYSTEMS:    Unable to examine due to [ ] Encephalopathy [ ] Advanced Dementia [ ] Expressive Aphasia [ ] Non-verbal patient    CONSTITUTIONAL: No fever, NO generalized weakness/Fatigue, No weight loss  EYES: No eye pain, visual disturbances, or discharge  ENMT:  No difficulty hearing, tinnitus, vertigo; No sinus or throat pain  NECK: No pain or stiffness  RESPIRATORY: No shortness of breath,  cough, wheezing, sputum or hemoptysis   CARDIOVASCULAR: No chest pain, palpitations, or leg swelling  GASTROINTESTINAL: No abdominal pain. No nausea, vomiting, diarrhea or constipation. No melena or hematochezia.  GENITOURINARY: No dysuria, frequency, hematuria, or incontinence  NEUROLOGICAL: No headaches, Dizziness, memory loss, loss of strength, numbness, or tremors  SKIN: No itching, burning, rashes, or lesions   MUSCULOSKELETAL: No joint pain or swelling; No muscle, back, or extremity pain  PSYCHIATRIC: No depression, anxiety, mood swings, or difficulty sleeping  HEME/LYMPH: No easy bruising, or bleeding gums      Vital Signs Last 24 Hrs  T(C): 36.3 (12 Sep 2022 14:05), Max: 37.2 (11 Sep 2022 21:55)  T(F): 97.4 (12 Sep 2022 14:05), Max: 98.9 (11 Sep 2022 21:55)  HR: 86 (12 Sep 2022 14:05) (67 - 86)  BP: 118/71 (12 Sep 2022 14:05) (117/73 - 138/78)  BP(mean): --  RR: 18 (12 Sep 2022 14:05) (18 - 18)  SpO2: 100% (12 Sep 2022 14:05) (94% - 100%)    Parameters below as of 12 Sep 2022 14:05  Patient On (Oxygen Delivery Method): nasal cannula  O2 Flow (L/min): 3      PHYSICAL EXAM:  GENERAL: NAD, well-developed, well-groomed  HEAD:  Atraumatic, Normocephalic  EYES: conjunctiva and sclera clear  ENMT: Moist mucous membranes  NECK: Supple, No JVD, Normal thyroid  CHEST/LUNG: Clear to Auscultation bilaterally; No rales, rhonchi, wheezing, or rubs  HEART: Regular rate and rhythm; No murmurs, rubs, or gallops  ABDOMEN: Soft, Nontender, Nondistended; Bowel sounds present  EXTREMITIES:  2+ Peripheral Pulses, No clubbing, cyanosis, or edema  SKIN: No rashes or lesions  NERVOUS SYSTEM:  Alert & Oriented X3, Good concentration; Motor Strength 5/5 B/L upper and lower extremities    LABS:                        10.9   8.33  )-----------( 115      ( 12 Sep 2022 12:00 )             33.8     09-12    140  |  107  |  90<H>  ----------------------------<  134<H>  5.2   |  23  |  3.85<H>    Ca    8.0<L>      12 Sep 2022 12:00  Phos  4.0     09-12  Mg     2.3     09-12    TPro  6.7  /  Alb  2.9<L>  /  TBili  0.7  /  DBili  x   /  AST  24  /  ALT  <6<L>  /  AlkPhos  116  09-12    PT/INR - ( 10 Sep 2022 20:16 )   PT: 11.9 sec;   INR: 1.00 ratio         PTT - ( 11 Sep 2022 17:30 )  PTT:27.7 sec    CAPILLARY BLOOD GLUCOSE              RADIOLOGY & ADDITIONAL TESTS:          Imaging Personally Reviewed:  [ ] YES  [ ] NO    Consultant(s) Notes Reviewed:  [ ] YES  [ ] NO    Care Discussed with Consultants/Other Providers [x ] YES  [ ] NO

## 2022-09-12 NOTE — DISCHARGE NOTE PROVIDER - NSDCMRMEDTOKEN_GEN_ALL_CORE_FT
aspirin 81 mg oral tablet: 1 tab(s) orally once a day  Brilinta (ticagrelor) 90 mg oral tablet: 1 tab(s) orally 2 times a day  metoprolol succinate 50 mg oral tablet, extended release: 1 tab(s) orally once a day  predniSONE 10 mg oral tablet: 1 tab(s) orally once a day   aspirin 81 mg oral tablet: 1 tab(s) orally once a day  Brilinta (ticagrelor) 90 mg oral tablet: 1 tab(s) orally 2 times a day  Dilaudid 2 mg oral tablet: 0.5 tab(s) orally every 6 hours, As Needed -for severe pain MDD:2 tabs  metoprolol succinate 50 mg oral tablet, extended release: 1 tab(s) orally once a day  predniSONE 10 mg oral tablet: 1 tab(s) orally once a day

## 2022-09-12 NOTE — PROGRESS NOTE ADULT - ASSESSMENT
Acute pulmonary edema  Acute Respiratory failure with Hypoxia POA; resolved   Fluid overload Secondary to missed HD   CXR: Trace bilateral pleural effusions and mild pulmonary vascular congestion  continue with Hemodialysis as scheduled   follow up with your PCP Nephrologist    Systemic lupus  continue with Prednisone    CAD (coronary artery disease)  continue with Aspirin, and Brilanta     Hypertension  continue with Metoprolol    ESRD on dialysis  Resume HD Monday/Wednesday/Friday  To get better and follow your care plan as instructed.

## 2022-09-12 NOTE — PROGRESS NOTE ADULT - REASON FOR ADMISSION
emergent HD, fluid volume overload

## 2022-09-12 NOTE — DISCHARGE NOTE PROVIDER - ATTENDING DISCHARGE PHYSICAL EXAMINATION:
GENERAL: NAD, well-groomed, well-developed  HEAD:  Atraumatic, Normocephalic  EYES:  conjunctiva and sclera clear  ENMT:  Moist mucous membranes  NECK: Supple, No JVD, Normal thyroid  NERVOUS SYSTEM:  Alert & Oriented X3, Good concentration; Motor Strength 5/5 B/L upper and lower extremities; DTRs 2+ intact and symmetric  CHEST/LUNG: Clear to ascultation bilaterally; No rales, rhonchi, wheezing, or rubs  HEART: Regular rate and rhythm; No murmurs, rubs, or gallops  ABDOMEN: Soft, Nontender, Nondistended; Bowel sounds present  EXTREMITIES:  2+ Peripheral Pulses, No clubbing, cyanosis, or edema  SKIN: No rashes or lesions

## 2022-09-12 NOTE — DISCHARGE NOTE PROVIDER - NSDCCPCAREPLAN_GEN_ALL_CORE_FT
PRINCIPAL DISCHARGE DIAGNOSIS  Diagnosis: Acute pulmonary edema  Assessment and Plan of Treatment: Acute Respiratory faillure with Hypoxia POA; resolved   Fluid overload Secondary to missed HD   CXR: Trace bilateral pleural effusions and mild pulmonary vascular congestion  continue with Hemodialysis as scheduled   follow up with your PCP Nephrologist      SECONDARY DISCHARGE DIAGNOSES  Diagnosis: Systemic lupus  Assessment and Plan of Treatment: continue with Prednisone    Diagnosis: CAD (coronary artery disease)  Assessment and Plan of Treatment: continue with Aspirin, and Brilanta    Diagnosis: Hypertension  Assessment and Plan of Treatment: continue with Metoprolol    Diagnosis: ESRD on dialysis  Assessment and Plan of Treatment: Resume HD Monday/Wednesday/Friday

## 2022-09-12 NOTE — DISCHARGE NOTE PROVIDER - HOSPITAL COURSE
57 year old female with a PMH of CAD s/p PCI with stents, MI, CHF, HTN, lupus, ESRD on HD (m/w/f) and right meniscus tear repair yesterday who presented to [facility] earlier this AM with c/o dyspnea and chest tightness s/p missed HD yesterday. Reportedly pt also undergoing stress at home and was just in argument with her grandson.     ED Course: CXR: IMPRESSION: Trace bilateral pleural effusions and mild pulmonary vascular congestion, NIV and Lasix IVP;  Nephrology consulted with plans for emergent HD     The patient was admitted ICU with impression of fluid overload secondary to missed HD. The patient received HD and her fluid overload resolved. The patient will be discharged home and with follow up with Nephrologist and Dialysis. Lip Wedge Excision Repair Text: Given the location of the defect and the proximity to free margins a full thickness wedge repair was deemed most appropriate.  Using a sterile surgical marker, the appropriate repair was drawn incorporating the defect and placing the expected incisions perpendicular to the vermilion border.  The vermilion border was also meticulously outlined to ensure appropriate reapproximation during the repair.  The area thus outlined was incised through and through with a #15 scalpel blade.  The muscularis and dermis were reaproximated with deep sutures following hemostasis. Care was taken to realign the vermilion border before proceeding with the superficial closure.  Once the vermilion was realigned the superfical and mucosal closure was finished.

## 2022-09-13 VITALS
TEMPERATURE: 99 F | HEART RATE: 70 BPM | DIASTOLIC BLOOD PRESSURE: 77 MMHG | OXYGEN SATURATION: 97 % | RESPIRATION RATE: 18 BRPM | SYSTOLIC BLOOD PRESSURE: 153 MMHG

## 2022-09-13 PROCEDURE — 99239 HOSP IP/OBS DSCHRG MGMT >30: CPT

## 2022-09-13 RX ORDER — HYDROMORPHONE HYDROCHLORIDE 2 MG/ML
0.5 INJECTION INTRAMUSCULAR; INTRAVENOUS; SUBCUTANEOUS
Qty: 8 | Refills: 0
Start: 2022-09-13 | End: 2022-09-16

## 2022-09-13 RX ADMIN — Medication 81 MILLIGRAM(S): at 12:38

## 2022-09-13 RX ADMIN — HYDROMORPHONE HYDROCHLORIDE 1 MILLIGRAM(S): 2 INJECTION INTRAMUSCULAR; INTRAVENOUS; SUBCUTANEOUS at 02:02

## 2022-09-13 RX ADMIN — HYDROMORPHONE HYDROCHLORIDE 1 MILLIGRAM(S): 2 INJECTION INTRAMUSCULAR; INTRAVENOUS; SUBCUTANEOUS at 09:28

## 2022-09-13 RX ADMIN — CHLORHEXIDINE GLUCONATE 1 APPLICATION(S): 213 SOLUTION TOPICAL at 05:39

## 2022-09-13 RX ADMIN — HYDROMORPHONE HYDROCHLORIDE 1 MILLIGRAM(S): 2 INJECTION INTRAMUSCULAR; INTRAVENOUS; SUBCUTANEOUS at 16:18

## 2022-09-13 RX ADMIN — Medication 25 MILLIGRAM(S): at 17:06

## 2022-09-13 RX ADMIN — HYDROMORPHONE HYDROCHLORIDE 1 MILLIGRAM(S): 2 INJECTION INTRAMUSCULAR; INTRAVENOUS; SUBCUTANEOUS at 01:02

## 2022-09-13 RX ADMIN — Medication 10 MILLIGRAM(S): at 05:39

## 2022-09-13 NOTE — DISCHARGE NOTE NURSING/CASE MANAGEMENT/SOCIAL WORK - PATIENT PORTAL LINK FT
You can access the FollowMyHealth Patient Portal offered by [facility] by registering at the following website: [email or web address]. By joining [employer]’s FollowMyHealth portal, you will also be able to view your health information using other applications (apps) compatible with our system.

## 2022-09-13 NOTE — DISCHARGE NOTE NURSING/CASE MANAGEMENT/SOCIAL WORK - NSDCPEFALRISK_GEN_ALL_CORE
For information on Fall & Injury Prevention, visit: [email or web address] OR  [email or web address] OR  https://www.cdc.gov/steadi/patient.html

## 2022-09-16 DIAGNOSIS — I25.2 OLD MYOCARDIAL INFARCTION: ICD-10-CM

## 2022-09-16 DIAGNOSIS — I50.9 HEART FAILURE, UNSPECIFIED: ICD-10-CM

## 2022-09-16 DIAGNOSIS — E87.70 FLUID OVERLOAD, UNSPECIFIED: ICD-10-CM

## 2022-09-16 DIAGNOSIS — N18.6 END STAGE RENAL DISEASE: ICD-10-CM

## 2022-09-16 DIAGNOSIS — I16.0 HYPERTENSIVE URGENCY: ICD-10-CM

## 2022-09-16 DIAGNOSIS — E87.2 ACIDOSIS: ICD-10-CM

## 2022-09-16 DIAGNOSIS — M32.9 SYSTEMIC LUPUS ERYTHEMATOSUS, UNSPECIFIED: ICD-10-CM

## 2022-09-16 DIAGNOSIS — I13.2 HYPERTENSIVE HEART AND CHRONIC KIDNEY DISEASE WITH HEART FAILURE AND WITH STAGE 5 CHRONIC KIDNEY DISEASE, OR END STAGE RENAL DISEASE: ICD-10-CM

## 2022-09-16 DIAGNOSIS — Z95.5 PRESENCE OF CORONARY ANGIOPLASTY IMPLANT AND GRAFT: ICD-10-CM

## 2022-09-16 DIAGNOSIS — I25.10 ATHEROSCLEROTIC HEART DISEASE OF NATIVE CORONARY ARTERY WITHOUT ANGINA PECTORIS: ICD-10-CM

## 2022-09-16 DIAGNOSIS — J81.1 CHRONIC PULMONARY EDEMA: ICD-10-CM

## 2022-09-16 DIAGNOSIS — Z88.1 ALLERGY STATUS TO OTHER ANTIBIOTIC AGENTS STATUS: ICD-10-CM

## 2022-09-16 DIAGNOSIS — J96.01 ACUTE RESPIRATORY FAILURE WITH HYPOXIA: ICD-10-CM

## 2022-09-16 DIAGNOSIS — Z99.2 DEPENDENCE ON RENAL DIALYSIS: ICD-10-CM

## 2022-09-16 DIAGNOSIS — Z88.9 ALLERGY STATUS TO UNSPECIFIED DRUGS, MEDICAMENTS AND BIOLOGICAL SUBSTANCES: ICD-10-CM

## 2022-10-10 ENCOUNTER — INPATIENT (INPATIENT)
Facility: HOSPITAL | Age: 57
LOS: 2 days | Discharge: HOME | End: 2022-10-13
Attending: INTERNAL MEDICINE | Admitting: INTERNAL MEDICINE

## 2022-10-10 VITALS
SYSTOLIC BLOOD PRESSURE: 224 MMHG | WEIGHT: 111.99 LBS | HEART RATE: 138 BPM | DIASTOLIC BLOOD PRESSURE: 122 MMHG | HEIGHT: 62 IN | RESPIRATION RATE: 42 BRPM | TEMPERATURE: 97 F | OXYGEN SATURATION: 90 %

## 2022-10-10 DIAGNOSIS — M32.9 SYSTEMIC LUPUS ERYTHEMATOSUS, UNSPECIFIED: ICD-10-CM

## 2022-10-10 DIAGNOSIS — Z29.9 ENCOUNTER FOR PROPHYLACTIC MEASURES, UNSPECIFIED: ICD-10-CM

## 2022-10-10 DIAGNOSIS — I10 ESSENTIAL (PRIMARY) HYPERTENSION: ICD-10-CM

## 2022-10-10 DIAGNOSIS — J96.01 ACUTE RESPIRATORY FAILURE WITH HYPOXIA: ICD-10-CM

## 2022-10-10 DIAGNOSIS — I25.10 ATHEROSCLEROTIC HEART DISEASE OF NATIVE CORONARY ARTERY WITHOUT ANGINA PECTORIS: ICD-10-CM

## 2022-10-10 DIAGNOSIS — N18.6 END STAGE RENAL DISEASE: ICD-10-CM

## 2022-10-10 LAB
ALBUMIN SERPL ELPH-MCNC: 3 G/DL — LOW (ref 3.3–5)
ALP SERPL-CCNC: 218 U/L — HIGH (ref 40–120)
ALT FLD-CCNC: 24 U/L — SIGNIFICANT CHANGE UP (ref 12–78)
ANION GAP SERPL CALC-SCNC: 14 MMOL/L — SIGNIFICANT CHANGE UP (ref 5–17)
APPEARANCE UR: ABNORMAL
APTT BLD: 25.2 SEC — LOW (ref 27.5–35.5)
AST SERPL-CCNC: 224 U/L — HIGH (ref 15–37)
BACTERIA # UR AUTO: ABNORMAL
BASE EXCESS BLDA CALC-SCNC: -3.1 MMOL/L — LOW (ref -2–3)
BASE EXCESS BLDA CALC-SCNC: -8.6 MMOL/L — LOW (ref -2–3)
BASOPHILS # BLD AUTO: 0 K/UL — SIGNIFICANT CHANGE UP (ref 0–0.2)
BASOPHILS NFR BLD AUTO: 0 % — SIGNIFICANT CHANGE UP (ref 0–2)
BILIRUB SERPL-MCNC: 1 MG/DL — SIGNIFICANT CHANGE UP (ref 0.2–1.2)
BILIRUB UR-MCNC: NEGATIVE — SIGNIFICANT CHANGE UP
BLOOD GAS COMMENTS ARTERIAL: SIGNIFICANT CHANGE UP
BLOOD GAS COMMENTS ARTERIAL: SIGNIFICANT CHANGE UP
BUN SERPL-MCNC: 45 MG/DL — HIGH (ref 7–23)
CALCIUM SERPL-MCNC: 8.9 MG/DL — SIGNIFICANT CHANGE UP (ref 8.5–10.1)
CHLORIDE SERPL-SCNC: 108 MMOL/L — SIGNIFICANT CHANGE UP (ref 96–108)
CO2 BLDA-SCNC: 22 MMOL/L — SIGNIFICANT CHANGE UP (ref 19–24)
CO2 BLDA-SCNC: 23 MMOL/L — SIGNIFICANT CHANGE UP (ref 19–24)
CO2 SERPL-SCNC: 20 MMOL/L — LOW (ref 22–31)
COLOR SPEC: YELLOW — SIGNIFICANT CHANGE UP
COMMENT - URINE: SIGNIFICANT CHANGE UP
CREAT SERPL-MCNC: 3.85 MG/DL — HIGH (ref 0.5–1.3)
DIFF PNL FLD: ABNORMAL
EGFR: 13 ML/MIN/1.73M2 — LOW
EOSINOPHIL # BLD AUTO: 0.11 K/UL — SIGNIFICANT CHANGE UP (ref 0–0.5)
EOSINOPHIL NFR BLD AUTO: 1 % — SIGNIFICANT CHANGE UP (ref 0–6)
EPI CELLS # UR: ABNORMAL
FLUAV AG NPH QL: SIGNIFICANT CHANGE UP
FLUBV AG NPH QL: SIGNIFICANT CHANGE UP
GAS PNL BLDA: SIGNIFICANT CHANGE UP
GAS PNL BLDA: SIGNIFICANT CHANGE UP
GLUCOSE SERPL-MCNC: 252 MG/DL — HIGH (ref 70–99)
GLUCOSE UR QL: NEGATIVE MG/DL — SIGNIFICANT CHANGE UP
HCO3 BLDA-SCNC: 20 MMOL/L — LOW (ref 21–28)
HCO3 BLDA-SCNC: 22 MMOL/L — SIGNIFICANT CHANGE UP (ref 21–28)
HCT VFR BLD CALC: 48.3 % — HIGH (ref 34.5–45)
HGB BLD-MCNC: 15 G/DL — SIGNIFICANT CHANGE UP (ref 11.5–15.5)
HOROWITZ INDEX BLDA+IHG-RTO: 50 — SIGNIFICANT CHANGE UP
HOROWITZ INDEX BLDA+IHG-RTO: 80 — SIGNIFICANT CHANGE UP
INR BLD: 0.93 RATIO — SIGNIFICANT CHANGE UP (ref 0.88–1.16)
KETONES UR-MCNC: NEGATIVE — SIGNIFICANT CHANGE UP
LACTATE SERPL-SCNC: 1.7 MMOL/L — SIGNIFICANT CHANGE UP (ref 0.7–2)
LACTATE SERPL-SCNC: 6.1 MMOL/L — CRITICAL HIGH (ref 0.7–2)
LEUKOCYTE ESTERASE UR-ACNC: ABNORMAL
LG PLATELETS BLD QL AUTO: SLIGHT — SIGNIFICANT CHANGE UP
LYMPHOCYTES # BLD AUTO: 38 % — SIGNIFICANT CHANGE UP (ref 13–44)
LYMPHOCYTES # BLD AUTO: 4.15 K/UL — HIGH (ref 1–3.3)
MACROCYTES BLD QL: SLIGHT — SIGNIFICANT CHANGE UP
MANUAL SMEAR VERIFICATION: SIGNIFICANT CHANGE UP
MCHC RBC-ENTMCNC: 31.1 G/DL — LOW (ref 32–36)
MCHC RBC-ENTMCNC: 34.2 PG — HIGH (ref 27–34)
MCV RBC AUTO: 110 FL — HIGH (ref 80–100)
MICROCYTES BLD QL: SLIGHT — SIGNIFICANT CHANGE UP
MONOCYTES # BLD AUTO: 0.33 K/UL — SIGNIFICANT CHANGE UP (ref 0–0.9)
MONOCYTES NFR BLD AUTO: 3 % — SIGNIFICANT CHANGE UP (ref 2–14)
NEUTROPHILS # BLD AUTO: 5.36 K/UL — SIGNIFICANT CHANGE UP (ref 1.8–7.4)
NEUTROPHILS NFR BLD AUTO: 49 % — SIGNIFICANT CHANGE UP (ref 43–77)
NITRITE UR-MCNC: NEGATIVE — SIGNIFICANT CHANGE UP
NRBC # BLD: 0 /100 — SIGNIFICANT CHANGE UP (ref 0–0)
NRBC # BLD: SIGNIFICANT CHANGE UP /100 WBCS (ref 0–0)
PCO2 BLDA: 39 MMHG — SIGNIFICANT CHANGE UP (ref 32–46)
PCO2 BLDA: 52 MMHG — HIGH (ref 32–46)
PH BLDA: 7.19 — CRITICAL LOW (ref 7.35–7.45)
PH BLDA: 7.36 — SIGNIFICANT CHANGE UP (ref 7.35–7.45)
PH UR: 9 — HIGH (ref 5–8)
PLAT MORPH BLD: NORMAL — SIGNIFICANT CHANGE UP
PLATELET # BLD AUTO: 106 K/UL — LOW (ref 150–400)
PO2 BLDA: 104 MMHG — SIGNIFICANT CHANGE UP (ref 83–108)
PO2 BLDA: 110 MMHG — HIGH (ref 83–108)
POIKILOCYTOSIS BLD QL AUTO: SLIGHT — SIGNIFICANT CHANGE UP
POTASSIUM SERPL-MCNC: 4.5 MMOL/L — SIGNIFICANT CHANGE UP (ref 3.5–5.3)
POTASSIUM SERPL-SCNC: 4.5 MMOL/L — SIGNIFICANT CHANGE UP (ref 3.5–5.3)
PROT SERPL-MCNC: 8.1 GM/DL — SIGNIFICANT CHANGE UP (ref 6–8.3)
PROT UR-MCNC: 100 MG/DL
PROTHROM AB SERPL-ACNC: 11.2 SEC — SIGNIFICANT CHANGE UP (ref 10.5–13.4)
RBC # BLD: 4.39 M/UL — SIGNIFICANT CHANGE UP (ref 3.8–5.2)
RBC # FLD: 15.5 % — HIGH (ref 10.3–14.5)
RBC BLD AUTO: SIGNIFICANT CHANGE UP
RBC CASTS # UR COMP ASSIST: ABNORMAL /HPF (ref 0–4)
SAO2 % BLDA: 98.2 % — HIGH (ref 94–98)
SAO2 % BLDA: 98.6 % — HIGH (ref 94–98)
SARS-COV-2 RNA SPEC QL NAA+PROBE: SIGNIFICANT CHANGE UP
SODIUM SERPL-SCNC: 142 MMOL/L — SIGNIFICANT CHANGE UP (ref 135–145)
SP GR SPEC: 1.01 — SIGNIFICANT CHANGE UP (ref 1.01–1.02)
UROBILINOGEN FLD QL: NEGATIVE MG/DL — SIGNIFICANT CHANGE UP
VARIANT LYMPHS # BLD: 9 % — HIGH (ref 0–6)
WBC # BLD: 10.93 K/UL — HIGH (ref 3.8–10.5)
WBC # FLD AUTO: 10.93 K/UL — HIGH (ref 3.8–10.5)
WBC UR QL: SIGNIFICANT CHANGE UP /HPF (ref 0–5)

## 2022-10-10 PROCEDURE — 93010 ELECTROCARDIOGRAM REPORT: CPT

## 2022-10-10 PROCEDURE — 99291 CRITICAL CARE FIRST HOUR: CPT

## 2022-10-10 PROCEDURE — 71045 X-RAY EXAM CHEST 1 VIEW: CPT | Mod: 26

## 2022-10-10 PROCEDURE — 99222 1ST HOSP IP/OBS MODERATE 55: CPT

## 2022-10-10 RX ORDER — ASPIRIN/CALCIUM CARB/MAGNESIUM 324 MG
1 TABLET ORAL
Qty: 0 | Refills: 0 | DISCHARGE

## 2022-10-10 RX ORDER — TICAGRELOR 90 MG/1
1 TABLET ORAL
Qty: 0 | Refills: 0 | DISCHARGE

## 2022-10-10 RX ORDER — NITROGLYCERIN 6.5 MG
20 CAPSULE, EXTENDED RELEASE ORAL
Qty: 50 | Refills: 0 | Status: DISCONTINUED | OUTPATIENT
Start: 2022-10-10 | End: 2022-10-10

## 2022-10-10 RX ORDER — ACETAMINOPHEN 500 MG
1000 TABLET ORAL ONCE
Refills: 0 | Status: COMPLETED | OUTPATIENT
Start: 2022-10-10 | End: 2022-10-10

## 2022-10-10 RX ORDER — METOPROLOL TARTRATE 50 MG
50 TABLET ORAL DAILY
Refills: 0 | Status: DISCONTINUED | OUTPATIENT
Start: 2022-10-10 | End: 2022-10-13

## 2022-10-10 RX ORDER — ACETAMINOPHEN 500 MG
750 TABLET ORAL ONCE
Refills: 0 | Status: COMPLETED | OUTPATIENT
Start: 2022-10-10 | End: 2022-10-10

## 2022-10-10 RX ORDER — METOPROLOL TARTRATE 50 MG
1 TABLET ORAL
Qty: 0 | Refills: 0 | DISCHARGE

## 2022-10-10 RX ORDER — HYDROMORPHONE HYDROCHLORIDE 2 MG/ML
0.5 INJECTION INTRAMUSCULAR; INTRAVENOUS; SUBCUTANEOUS ONCE
Refills: 0 | Status: DISCONTINUED | OUTPATIENT
Start: 2022-10-10 | End: 2022-10-11

## 2022-10-10 RX ORDER — HYDROMORPHONE HYDROCHLORIDE 2 MG/ML
0.5 INJECTION INTRAMUSCULAR; INTRAVENOUS; SUBCUTANEOUS ONCE
Refills: 0 | Status: DISCONTINUED | OUTPATIENT
Start: 2022-10-10 | End: 2022-10-10

## 2022-10-10 RX ORDER — CALCIUM ACETATE 667 MG
1 TABLET ORAL
Qty: 0 | Refills: 0 | DISCHARGE

## 2022-10-10 RX ORDER — ASPIRIN/CALCIUM CARB/MAGNESIUM 324 MG
81 TABLET ORAL DAILY
Refills: 0 | Status: DISCONTINUED | OUTPATIENT
Start: 2022-10-10 | End: 2022-10-13

## 2022-10-10 RX ORDER — TICAGRELOR 90 MG/1
90 TABLET ORAL EVERY 12 HOURS
Refills: 0 | Status: DISCONTINUED | OUTPATIENT
Start: 2022-10-10 | End: 2022-10-13

## 2022-10-10 RX ORDER — HEPARIN SODIUM 5000 [USP'U]/ML
5000 INJECTION INTRAVENOUS; SUBCUTANEOUS EVERY 12 HOURS
Refills: 0 | Status: DISCONTINUED | OUTPATIENT
Start: 2022-10-10 | End: 2022-10-13

## 2022-10-10 RX ORDER — FUROSEMIDE 40 MG
40 TABLET ORAL ONCE
Refills: 0 | Status: COMPLETED | OUTPATIENT
Start: 2022-10-10 | End: 2022-10-10

## 2022-10-10 RX ADMIN — Medication 10 MILLIGRAM(S): at 11:29

## 2022-10-10 RX ADMIN — HYDROMORPHONE HYDROCHLORIDE 0.5 MILLIGRAM(S): 2 INJECTION INTRAMUSCULAR; INTRAVENOUS; SUBCUTANEOUS at 06:33

## 2022-10-10 RX ADMIN — Medication 20 MICROGRAM(S)/MIN: at 04:06

## 2022-10-10 RX ADMIN — HYDROMORPHONE HYDROCHLORIDE 0.5 MILLIGRAM(S): 2 INJECTION INTRAMUSCULAR; INTRAVENOUS; SUBCUTANEOUS at 07:51

## 2022-10-10 RX ADMIN — Medication 81 MILLIGRAM(S): at 11:29

## 2022-10-10 RX ADMIN — Medication 1000 MILLIGRAM(S): at 22:56

## 2022-10-10 RX ADMIN — TICAGRELOR 90 MILLIGRAM(S): 90 TABLET ORAL at 21:41

## 2022-10-10 RX ADMIN — Medication 750 MILLIGRAM(S): at 05:15

## 2022-10-10 RX ADMIN — Medication 6 MICROGRAM(S)/MIN: at 03:57

## 2022-10-10 RX ADMIN — HEPARIN SODIUM 5000 UNIT(S): 5000 INJECTION INTRAVENOUS; SUBCUTANEOUS at 21:36

## 2022-10-10 RX ADMIN — Medication 300 MILLIGRAM(S): at 04:46

## 2022-10-10 RX ADMIN — Medication 40 MILLIGRAM(S): at 07:36

## 2022-10-10 RX ADMIN — Medication 400 MILLIGRAM(S): at 21:56

## 2022-10-10 RX ADMIN — Medication 750 MILLIGRAM(S): at 05:31

## 2022-10-10 NOTE — H&P ADULT - NSHPLABSRESULTS_GEN_ALL_CORE
Recent Vitals  T(C): 36.3 (10-10-22 @ 03:32), Max: 36.3 (10-10-22 @ 03:32)  HR: 92 (10-10-22 @ 04:48) (92 - 138)  BP: 147/90 (10-10-22 @ 05:31) (147/90 - 224/122)  RR: 38 (10-10-22 @ 04:48) (38 - 42)  SpO2: 98% (10-10-22 @ 04:48) (90% - 100%)                        15.0   10.93 )-----------( 106      ( 10 Oct 2022 03:50 )             48.3     10-10    142  |  108  |  45<H>  ----------------------------<  252<H>  4.5   |  20<L>  |  3.85<H>    Ca    8.9      10 Oct 2022 03:50    TPro  8.1  /  Alb  3.0<L>  /  TBili  1.0  /  DBili  x   /  AST  224<H>  /  ALT  24  /  AlkPhos  218<H>  10-10    PT/INR - ( 10 Oct 2022 03:50 )   PT: 11.2 sec;   INR: 0.93 ratio         PTT - ( 10 Oct 2022 03:50 )  PTT:25.2 sec  LIVER FUNCTIONS - ( 10 Oct 2022 03:50 )  Alb: 3.0 g/dL / Pro: 8.1 gm/dL / ALK PHOS: 218 U/L / ALT: 24 U/L / AST: 224 U/L / GGT: x               Home Medications:  aspirin 81 mg oral tablet: 1 tab(s) orally once a day (12 Sep 2022 14:15)  Brilinta (ticagrelor) 90 mg oral tablet: 1 tab(s) orally 2 times a day (11 Sep 2022 00:14)  metoprolol succinate 50 mg oral tablet, extended release: 1 tab(s) orally once a day (11 Sep 2022 00:10)  predniSONE 10 mg oral tablet: 1 tab(s) orally once a day (12 Sep 2022 14:15)

## 2022-10-10 NOTE — CONSULT NOTE ADULT - SUBJECTIVE AND OBJECTIVE BOX
[facility] NEPHROLOGY SERVICES, [facility]  NEPHROLOGY AND HYPERTENSION  [address]  [address]  [place] [address]  [phone]    MD JESSICA RAY MD ANDREY GONCHARUK, MD MADHU KORRAPATI, MD YELENA ROSENBERG, MD BINNY KOSHY, MD CHRISTOPHER CAPUTO, MD EDWARD BOVER, MD      Information from chart:  "Patient is a 57y old  Female who presents with a chief complaint of acute dyspnea (10 Oct 2022 06:56)    HPI:  Patient is a 57F with a PMH of PMH of CAD s/p PCI with stents, MI, CHF, HTN, lupus, ESRD on HD (m/w/f) who presents to the ED for acute dyspnea.  Patient states that throughout the day yesterday she was not feeling well - reports history of headache and fatigue.  Patient states that she woke up at 0300 this morning with severe shortness of breath.  Her dyspnea continued to persist for over ten minutes - called EMS.  When EMS arrived, they noted her SpO2 was in the 70s.  Placed on CPAP in the field, currently on BiPAP in ED.  Patient currently has no active complaints, comfortable on BiPAP.  Recently discharged from [facility] for similar complaints.  During that visit patient required urgent HD to resolve acute dyspnea.  Labs show lactic acidosis.  Will admit to tele.   (10 Oct 2022 06:56)   "    No distress  On bipap'   Awake and alert         PAST MEDICAL & SURGICAL HISTORY:  End stage renal disease      Hypertension      Lupus      CAD (coronary artery disease)        FAMILY HISTORY:  FH: HTN (hypertension)      Allergies    Dilantin (Rash)  erythromycin (Rash)    Intolerances      Home Medications:  aspirin 81 mg oral tablet: 1 tab(s) orally once a day (10 Oct 2022 13:48)  Brilinta (ticagrelor) 90 mg oral tablet: 1 tab(s) orally 2 times a day (10 Oct 2022 13:48)  calcium acetate 667 mg oral capsule: 1 cap(s) orally once a day (10 Oct 2022 13:48)  metoprolol succinate 50 mg oral tablet, extended release: 1 tab(s) orally once a day (10 Oct 2022 13:48)  predniSONE 10 mg oral tablet: 1 tab(s) orally once a day (10 Oct 2022 13:48)    MEDICATIONS  (STANDING):  aspirin enteric coated 81 milliGRAM(s) Oral daily  heparin   Injectable 5000 Unit(s) SubCutaneous every 12 hours  metoprolol succinate ER 50 milliGRAM(s) Oral daily  predniSONE   Tablet 10 milliGRAM(s) Oral daily  ticagrelor 90 milliGRAM(s) Oral every 12 hours    MEDICATIONS  (PRN):    Vital Signs Last 24 Hrs  T(C): 36.7 (10 Oct 2022 21:05), Max: 36.9 (10 Oct 2022 18:54)  T(F): 98 (10 Oct 2022 21:05), Max: 98.4 (10 Oct 2022 18:54)  HR: 73 (10 Oct 2022 21:05) (63 - 138)  BP: 108/64 (10 Oct 2022 21:05) (97/58 - 224/122)  BP(mean): --  RR: 17 (10 Oct 2022 21:05) (17 - 42)  SpO2: 98% (10 Oct 2022 21:) (90% - 100%)    Parameters below as of 10 Oct 2022 21:  Patient On (Oxygen Delivery Method): nasal cannula  O2 Flow (L/min): 2      Daily Height in cm: 157.48 (10 Oct 2022 03:32)    Daily Weight in k.7 (10 Oct 2022 18:54)    10-10-22 @ 07:01  -  10-10-22 @ 21:14  --------------------------------------------------------  IN: 0 mL / OUT: 2500 mL / NET: -2500 mL      CAPILLARY BLOOD GLUCOSE        PHYSICAL EXAM:      T(C): 36.7 (10-10-22 @ 21:05), Max: 36.9 (10-10-22 @ 18:54)  HR: 73 (10-10-22 @ 21:05) (63 - 138)  BP: 108/64 (10-10-22 @ 21:05) (97/58 - 224/122)  RR: 17 (10-10-22 @ 21:05) (17 - 42)  SpO2: 98% (10-10-22 @ 21:05) (90% - 100%)  Wt(kg): --  Lungs clear  Heart S1S2  Abd soft NT ND  Extremities:   tr edema              10-10    142  |  108  |  45<H>  ----------------------------<  252<H>  4.5   |  20<L>  |  3.85<H>    Ca    8.9      10 Oct 2022 03:50    TPro  8.1  /  Alb  3.0<L>  /  TBili  1.0  /  DBili  x   /  AST  224<H>  /  ALT  24  /  AlkPhos  218<H>  10-10                          15.0   10.93 )-----------( 106      ( 10 Oct 2022 03:50 )             48.3     Creatinine Trend: 3.85<--, 3.85<--, 3.33<--  Urinalysis Basic - ( 10 Oct 2022 10:00 )    Color: Yellow / Appearance: very cloudy / S.015 / pH: x  Gluc: x / Ketone: Negative  / Bili: Negative / Urobili: Negative mg/dL   Blood: x / Protein: 100 mg/dL / Nitrite: Negative   Leuk Esterase: Moderate / RBC: 6-10 /HPF / WBC TNTC /HPF   Sq Epi: x / Non Sq Epi: Many / Bacteria: Moderate      ABG - ( 10 Oct 2022 06:17 )  pH, Arterial: 7.36  pH, Blood: x     /  pCO2: 39    /  pO2: 110   / HCO3: 22    / Base Excess: -3.1  /  SaO2: 98.6                Assessment   ESRD, fluid overload     Plan:  HD for today  UF as tolerated   Will follow course.        Juan Darby MD

## 2022-10-10 NOTE — H&P ADULT - NSHPPHYSICALEXAM_GEN_ALL_CORE
Physical exam:  General: patient in no acute distress, resting comfortably  Head:  Atraumatic, Normocephalic  Eyes: EOMI, PERRLA, clear sclera  Neck: Supple, thyroid nontender, non enlarged  Cardio: S1/S2 +ve, regular rate and rhythm, no M/G/R  Resp: moderate rales bilaterally, worse at bases  GI: abdomen soft, nontender, non distended, no guarding, BS +ve x 4  Ext: no significant pedal edema  Neuro: CN 2-12 intact, no significant motor or sensory deficits.  Skin: No rashes or lesions

## 2022-10-10 NOTE — H&P ADULT - NSHPREVIEWOFSYSTEMS_GEN_ALL_CORE
Constitutional: no fever, chills, night sweats  Ears: no hearing changes or ear pain,   Nose: no nasal congestion, sinus pain, or rhinorrhea  Cardio: chest pain positive.  No orthopnea, edema, or palpitations  Resp: dyspnea positive.  No cough, wheezing  GI: no nausea, vomiting, diarrhea, constipation, hematochezia, or melena  : no dysuria, urinary frequency, hematuria  MSK: no back pain, neck pain  Skin: no rash, pruritis   Neuro: no weakness, dizziness, lightheadedness, syncope   Heme/Lymph: no bruising or bleeding

## 2022-10-10 NOTE — PATIENT PROFILE ADULT - FUNCTIONAL ASSESSMENT - BASIC MOBILITY 3.
Discharge Instructions     If you have any questions/concerns about your baby, please call your baby's doctor.     DO NOT GIVE BABY ANY MEDICATION unless directed by your doctor.     Call the Doctor if:  ·            Fever 100.4 degrees F or above  ·            Forceful vomiting (not spitting up)  ·            Several feedings when infant does not suck  ·            Watery, runny stools (mucous, blood or foul odor)  ·            Infant injury (fall from bed or table, dropped or severely shaken)  ·            Constant crying  ·            Any unusual rash  ·            Yellow color of the eyes or skin  ·            Has less than 4 wet diapers in a 24 hr period in the first week of life, and less              than 6 wet diapers in a 24 hr period after the baby is 7 days old  ·            No stool for 48 hours  ·            Redness, drainage or foul odor from the umbilical cord          
2 = A lot of assistance

## 2022-10-10 NOTE — ED ADULT NURSE NOTE - ED STAT RN HANDOFF DETAILS
Pt is awake and alert in stretcher. Pt remains on BiPAP. Pt is in NAD at this time. VS stable. IV intact Report given to JOSH Duncan.

## 2022-10-10 NOTE — CONSULT NOTE ADULT - ASSESSMENT
58yo F with PMH of CAD s/p PCI with stents, MI, CHF, HTN, lupus, ESRD on HD (MWF), s/p right meniscus tear repair, presented to [facility] early this AM with c/o dyspnea and chest tightness. Pt states she did not miss any dialysis sessions, last session on Friday. In ED pt noted to be hypertensive 224/122 and in fluid overload; improvement noted in breathing with placement of NIPPV. Initially placed on nitroglycerin drip in ED, d/c'd after improvement in BP. ICU consulted for acute hypoxic respiratory failure.     --Pt seen and examined at bedside in ED. Currently saturating 100%, breathing comfortably and speaking full sentences with BIPAP mask on. No signs of respiratory distress. Endorses improvement in dyspnea.   --Recommend nephrology consult, HD as soon as possible.   --BP initially 220s/120s, now SBP 140s; nitroglycerin gtt d/c'd by ED team.   --Initial respiratory acidosis pH 7.19/52/104/20 improved to 7.36/39/110/22.   --Lactic acidosis 6.0 on arrival, now improved to 1.7.   --Pt does not require ICU level of care at this time. If worsening of condition please re-consult.    Discussed with eICU attending Dr. Antony.

## 2022-10-10 NOTE — H&P ADULT - HISTORY OF PRESENT ILLNESS
Patient is a 57F with a PMH of PMH of CAD s/p PCI with stents, MI, CHF, HTN, lupus, ESRD on HD (m/w/f) who presents to the ED for acute dyspnea.  Patient states that throughout the day yesterday she was not feeling well - reports history of headache and fatigue.  Patient states that she woke up at 0300 this morning with severe shortness of breath.  Her dyspnea continued to persist for over ten minutes - called EMS.  When EMS arrived, they noted her SpO2 was in the 70s.  Placed on CPAP in the field, currently on BiPAP in ED.  Patient currently has no active complaints, comfortable on BiPAP.  Recently discharged from [facility] for similar complaints.  During that visit patient required urgent HD to resolve acute dyspnea.  Labs show lactic acidosis.  Will admit to tele.

## 2022-10-10 NOTE — ED PROVIDER NOTE - OBJECTIVE STATEMENT
58 y/o F w hx HTN, CAD, lupus, ESRD on MWF dialysis presenting to the ED with dyspnea. Per EMS, patient found at home saturating in the 70s, BP in 200s systolic, tachycardic. She was given NTG and put on CPAP. Patient endorses chest pain and shortness of breath. No fever or chills. No N/V.

## 2022-10-10 NOTE — ED ADULT TRIAGE NOTE - CHIEF COMPLAINT QUOTE
BIBA for acute respiratory distress x 45 minutes, rales, chest pain hx of lupus, chf, mi and on dialysis mwf. patient on cpap upon arrival to ED. as per emt patient was sating 72% on RA and was 92-94% on cpap

## 2022-10-10 NOTE — CHART NOTE - NSCHARTNOTEFT_GEN_A_CORE
seen and examined  breathing improving  will need HD  TTE  Vital Signs Last 24 Hrs  T(C): 36.3 (10 Oct 2022 11:02), Max: 36.4 (10 Oct 2022 08:00)  T(F): 97.3 (10 Oct 2022 11:02), Max: 97.5 (10 Oct 2022 08:00)  HR: 63 (10 Oct 2022 11:46) (63 - 138)  BP: 109/68 (10 Oct 2022 11:02) (102/64 - 224/122)  BP(mean): --  RR: 18 (10 Oct 2022 11:02) (18 - 42)  SpO2: 99% (10 Oct 2022 11:46) (90% - 100%)    Parameters below as of 10 Oct 2022 10:15  Patient On (Oxygen Delivery Method): BiPAP/CPAP    O2 Concentration (%): 40                          15.0   10.93 )-----------( 106      ( 10 Oct 2022 03:50 )             48.3     10-10    142  |  108  |  45<H>  ----------------------------<  252<H>  4.5   |  20<L>  |  3.85<H>    Ca    8.9      10 Oct 2022 03:50    TPro  8.1  /  Alb  3.0<L>  /  TBili  1.0  /  DBili  x   /  AST  224<H>  /  ALT  24  /  AlkPhos  218<H>  10-10    PT/INR - ( 10 Oct 2022 03:50 )   PT: 11.2 sec;   INR: 0.93 ratio         PTT - ( 10 Oct 2022 03:50 )  PTT:25.2 sec  Urinalysis Basic - ( 10 Oct 2022 10:00 )    Color: Yellow / Appearance: very cloudy / S.015 / pH: x  Gluc: x / Ketone: Negative  / Bili: Negative / Urobili: Negative mg/dL   Blood: x / Protein: 100 mg/dL / Nitrite: Negative   Leuk Esterase: Moderate / RBC: 6-10 /HPF / WBC TNTC /HPF   Sq Epi: x / Non Sq Epi: Many / Bacteria: Moderate Yes

## 2022-10-10 NOTE — ED ADULT NURSE NOTE - NSIMPLEMENTINTERV_GEN_ALL_ED
Implemented All Fall Risk Interventions:  [place] to call system. Call bell, personal items and telephone within reach. Instruct patient to call for assistance. Room bathroom lighting operational. Non-slip footwear when patient is off stretcher. Physically safe environment: no spills, clutter or unnecessary equipment. Stretcher in lowest position, wheels locked, appropriate side rails in place. Provide visual cue, wrist band, yellow gown, etc. Monitor gait and stability. Monitor for mental status changes and reorient to person, place, and time. Review medications for side effects contributing to fall risk. Reinforce activity limits and safety measures with patient and family.

## 2022-10-10 NOTE — ED ADULT NURSE REASSESSMENT NOTE - NS ED NURSE REASSESS COMMENT FT1
Report received from JOSH Mercado. pt is a&ox3. Pt remains on BIPAP. NAD at this time. Will continue to monitor. Safety maintained.

## 2022-10-10 NOTE — H&P ADULT - PROBLEM SELECTOR PLAN 1
Currently comfortable on BiPAP.  ABG improved  Will consult Dr Darby for HD ASAP  lasix IV  Rpt lactate pending  Likely from hypertensive urgency - SBP >220 on arrival

## 2022-10-10 NOTE — ED PROVIDER NOTE - CLINICAL SUMMARY MEDICAL DECISION MAKING FREE TEXT BOX
56 y/o F with PMH HTN, CAD, lupus, ESRD on MWF dialysis presenting to the ED with dyspnea. Patient in respiratory distress, likely fluid overloaded. Transitioned to BIPAP upon arrival. Will start NTG drip. Likely needs dialysis.

## 2022-10-10 NOTE — H&P ADULT - ASSESSMENT
Patient is a 57F with a PMH of PMH of CAD s/p PCI with stents, MI, CHF, HTN, lupus, ESRD on HD (m/w/f) who presents to the ED for acute dyspnea.  Patient states that throughout the day yesterday she was not feeling well - reports history of headache and fatigue.  Patient states that she woke up at 0300 this morning with severe shortness of breath.  Her dyspnea continued to persist for over ten minutes - called EMS.  When EMS arrived, they noted her SpO2 was in the 70s.  Placed on CPAP in the field, currently on BiPAP in ED.  Patient currently has no active complaints, comfortable on BiPAP.  Recently discharged from [facility] for similar complaints.  During that visit patient required urgent HD to resolve acute dyspnea.  Labs show lactic acidosis.  Will admit to tele.      IMPROVE VTE Individual Risk Assessment          RISK                                                          Points  [  ] Previous VTE                                                3  [  ] Thrombophilia                                             2  [  ] Lower limb paralysis                                    2        (unable to hold up >15 seconds)    [  ] Current Cancer                                             2         (within 6 months)  [  ] Immobilization > 24 hrs                              1  [  ] ICU/CCU stay > 24 hours                            1  [  ] Age > 60                                                    1    IMPROVE VTE Score - 1

## 2022-10-10 NOTE — CONSULT NOTE ADULT - SUBJECTIVE AND OBJECTIVE BOX
HPI: 58yo F with PMH of CAD s/p PCI with stents, MI, CHF, HTN, lupus, ESRD on HD (MWF), s/p right meniscus tear repair, presented to [facility] early this AM with c/o dyspnea and chest tightness. Pt states she did not miss any dialysis sessions, last session on Friday. In ED pt noted to be hypertensive 224/122 and in fluid overload; improvement noted in breathing with placement of NIPPV. Initially placed on nitroglycerin drip in ED, d/c'd after improvement in BP. ICU consulted for acute hypoxic respiratory failure.     Allergies    Dilantin (Rash)  erythromycin (Rash)    Intolerances        MEDICATIONS  NEURO  Meds:   RESPIRATORY  ABG - ( 10 Oct 2022 06:17 )  pH: 7.36  /  pCO2: 39    /  pO2: 110   / HCO3: 22    / Base Excess: -3.1  /  SaO2: 98.6    Lactate: x                Meds:   CARDIOVASCULAR  Meds:   GI/NUTRITION  Meds:   GENITOURINARY  Meds:   HEMATOLOGIC  Meds:   [x] VTE Prophylaxis  INFECTIOUS DISEASES  Meds:   ENDOCRINE  CAPILLARY BLOOD GLUCOSE        Meds:   OTHER MEDICATIONS:  :    Drug Dosing Weight  Height (cm): 157.5 (10 Oct 2022 03:32)  Weight (kg): 50.8 (10 Oct 2022 03:32)  BMI (kg/m2): 20.5 (10 Oct 2022 03:32)  BSA (m2): 1.49 (10 Oct 2022 03:32)    PAST MEDICAL & SURGICAL HISTORY:  End stage renal disease      Hypertension      Lupus      CAD (coronary artery disease)          FAMILY HISTORY:      SOCIAL HISTORY:    ADVANCE DIRECTIVES:    REVIEW OF SYSTEMS:    CONSTITUTIONAL: No fever, weight loss, or fatigue  EYES: No eye pain, visual disturbances, or discharge  ENMT:  No difficulty hearing, tinnitus, vertigo; No sinus or throat pain  NECK: No pain or stiffness  BREASTS: No pain, masses, or nipple discharge  RESPIRATORY: No cough, wheezing, chills or hemoptysis; No shortness of breath  CARDIOVASCULAR: No chest pain, palpitations, dizziness, or leg swelling  GASTROINTESTINAL: No abdominal or epigastric pain. No nausea, vomiting, or hematemesis; No diarrhea or constipation. No melena or hematochezia.  GENITOURINARY: No dysuria, frequency, hematuria, or incontinence  NEUROLOGICAL: No headaches, memory loss, loss of strength, numbness, or tremors  SKIN: No itching, burning, rashes, or lesions   LYMPH NODES: No enlarged glands  ENDOCRINE: No heat or cold intolerance; No hair loss  MUSCULOSKELETAL: No joint pain or swelling; No muscle, back, or extremity pain  PSYCHIATRIC: No depression, anxiety, mood swings, or difficulty sleeping  HEME/LYMPH: No easy bruising, or bleeding gums  ALLERGY AND IMMUNOLOGIC: No hives or eczema      ICU Vital Signs Last 24 Hrs  T(C): 36.3 (10 Oct 2022 03:32), Max: 36.3 (10 Oct 2022 03:32)  T(F): 97.3 (10 Oct 2022 03:32), Max: 97.3 (10 Oct 2022 03:32)  HR: 92 (10 Oct 2022 04:48) (92 - 138)  BP: 147/90 (10 Oct 2022 05:31) (147/90 - 224/122)  BP(mean): --  ABP: --  ABP(mean): --  RR: 38 (10 Oct 2022 04:48) (38 - 42)  SpO2: 98% (10 Oct 2022 04:48) (90% - 100%)    O2 Parameters below as of 10 Oct 2022 04:48  Patient On (Oxygen Delivery Method): BiPAP/CPAP            ABG - ( 10 Oct 2022 06:17 )  pH, Arterial: 7.36  pH, Blood: x     /  pCO2: 39    /  pO2: 110   / HCO3: 22    / Base Excess: -3.1  /  SaO2: 98.6                I&O's Detail      PHYSICAL EXAM:    GENERAL: NAD, well-groomed, well-developed  HEAD:  Atraumatic, Normocephalic  EYES: EOMI, PERRLA, conjunctiva and sclera clear  ENMT: No tonsillar erythema, exudates, or enlargement; Moist mucous membranes, Good dentition, No lesions  NECK: Supple, No JVD, Normal thyroid  NERVOUS SYSTEM:  Alert & Oriented X3, good concentration; Motor Strength 5/5 B/L upper and lower extremities; DTRs 2+ intact and symmetric  CHEST/LUNG: Clear to ausucltation bilaterally; No rales, rhonchi, wheezing, or rubs  HEART: Regular rate and rhythm; No murmurs, rubs, or gallops  ABDOMEN: Soft, Nontender, Nondistended; Bowel sounds present  EXTREMITIES:  2+ Peripheral Pulses, No clubbing, cyanosis, or edema  LYMPH: No lymphadenopathy noted  SKIN: No rashes or lesions    LABS:  CBC Full  -  ( 10 Oct 2022 03:50 )  WBC Count : 10.93 K/uL  RBC Count : 4.39 M/uL  Hemoglobin : 15.0 g/dL  Hematocrit : 48.3 %  Platelet Count - Automated : 106 K/uL  Mean Cell Volume : 110.0 fl  Mean Cell Hemoglobin : 34.2 pg  Mean Cell Hemoglobin Concentration : 31.1 g/dL  Auto Neutrophil # : 5.36 K/uL  Auto Lymphocyte # : 4.15 K/uL  Auto Monocyte # : 0.33 K/uL  Auto Eosinophil # : 0.11 K/uL  Auto Basophil # : 0.00 K/uL  Auto Neutrophil % : 49.0 %  Auto Lymphocyte % : 38.0 %  Auto Monocyte % : 3.0 %  Auto Eosinophil % : 1.0 %  Auto Basophil % : 0.0 %    10-10    142  |  108  |  45<H>  ----------------------------<  252<H>  4.5   |  20<L>  |  3.85<H>    Ca    8.9      10 Oct 2022 03:50    TPro  8.1  /  Alb  3.0<L>  /  TBili  1.0  /  DBili  x   /  AST  224<H>  /  ALT  24  /  AlkPhos  218<H>  10-10    CAPILLARY BLOOD GLUCOSE        PT/INR - ( 10 Oct 2022 03:50 )   PT: 11.2 sec;   INR: 0.93 ratio         PTT - ( 10 Oct 2022 03:50 )  PTT:25.2 sec      EKG:    ECHO, US:    RADIOLOGY:    CRITICAL CARE TIME SPENT:   HPI: 58yo F with PMH of CAD s/p PCI with stents, MI, CHF, HTN, lupus, ESRD on HD (MWF), s/p right meniscus tear repair, presented to [facility] early this AM with c/o dyspnea and chest tightness. Pt states she did not miss any dialysis sessions, last session on Friday. In ED pt noted to be hypertensive 224/122 and in fluid overload; improvement noted in breathing with placement of NIPPV. Initially placed on nitroglycerin drip in ED, d/c'd after improvement in BP. ICU consulted for acute hypoxic respiratory failure.     Allergies  Dilantin (Rash)  erythromycin (Rash)    MEDICATIONS  NEURO  Meds:   RESPIRATORY  ABG - ( 10 Oct 2022 06:17 )  pH: 7.36  /  pCO2: 39    /  pO2: 110   / HCO3: 22    / Base Excess: -3.1  /  SaO2: 98.6    Lactate: x        Meds:   [x] VTE Prophylaxis    Drug Dosing Weight  Height (cm): 157.5 (10 Oct 2022 03:32)  Weight (kg): 50.8 (10 Oct 2022 03:32)  BMI (kg/m2): 20.5 (10 Oct 2022 03:32)  BSA (m2): 1.49 (10 Oct 2022 03:32)    PAST MEDICAL & SURGICAL HISTORY:  End stage renal disease  Hypertension  Lupus  CAD (coronary artery disease)    SOCIAL HISTORY: Lives at home. Denies tobacco/EtOH/illicit.     REVIEW OF SYSTEMS: Endorses SOB, chest tightness, fatigue and chills at onset of symptoms. Currently endorses resolution of symptoms.     Vital Signs Last 24 Hrs  T(C): 36.3 (10 Oct 2022 03:32), Max: 36.3 (10 Oct 2022 03:32)  T(F): 97.3 (10 Oct 2022 03:32), Max: 97.3 (10 Oct 2022 03:32)  HR: 92 (10 Oct 2022 04:48) (92 - 138)  BP: 147/90 (10 Oct 2022 05:31) (147/90 - 224/122)  RR: 38 (10 Oct 2022 04:48) (38 - 42)  SpO2: 98% (10 Oct 2022 04:48) (90% - 100%)    O2 Parameters below as of 10 Oct 2022 04:48  Patient On (Oxygen Delivery Method): BiPAP/CPAP    ABG - ( 10 Oct 2022 06:17 )  pH, Arterial: 7.36  pH, Blood: x     /  pCO2: 39    /  pO2: 110   / HCO3: 22    / Base Excess: -3.1  /  SaO2: 98.6      PHYSICAL EXAM:    GENERAL: Pt lying in stretcher in NAD, well-groomed, well-developed. BIPAP mask on.   HEENT: NCAT, PERRL, EOMI, moist mucous membranes. Neck supple, FROM, no JVD.   NERVOUS SYSTEM: AAOx3, good concentration, moving all four extremities, no focal neuro deficits; strength & sensation intact in b/l upper & lower extremities.  CHEST/LUNG: Diminished at bases, no w/r/r appreciated  HEART: +S1S2, RRR, no m/r/g  ABDOMEN: Soft, nontender, nondistended; +BS  EXTREMITIES: 2+ peripheral pulses; no c/c/e  SKIN: No rashes or lesions    LABS:                        15.0   10.93 )-----------( 106      ( 10 Oct 2022 03:50 )             48.3   10-10    142  |  108  |  45<H>  ----------------------------<  252<H>  4.5   |  20<L>  |  3.85<H>    Ca    8.9      10 Oct 2022 03:50    TPro  8.1  /  Alb  3.0<L>  /  TBili  1.0  /  DBili  x   /  AST  224<H>  /  ALT  24  /  AlkPhos  218<H>  10-10    PT/INR - ( 10 Oct 2022 03:50 )   PT: 11.2 sec;   INR: 0.93 ratio    PTT - ( 10 Oct 2022 03:50 )  PTT:25.2 sec

## 2022-10-11 LAB
CULTURE RESULTS: SIGNIFICANT CHANGE UP
HCV AB S/CO SERPL IA: 0.08 S/CO — SIGNIFICANT CHANGE UP (ref 0–0.99)
HCV AB SERPL-IMP: SIGNIFICANT CHANGE UP
SPECIMEN SOURCE: SIGNIFICANT CHANGE UP

## 2022-10-11 PROCEDURE — 93306 TTE W/DOPPLER COMPLETE: CPT | Mod: 26

## 2022-10-11 PROCEDURE — 99233 SBSQ HOSP IP/OBS HIGH 50: CPT

## 2022-10-11 RX ORDER — HYDROMORPHONE HYDROCHLORIDE 2 MG/ML
2 INJECTION INTRAMUSCULAR; INTRAVENOUS; SUBCUTANEOUS EVERY 6 HOURS
Refills: 0 | Status: DISCONTINUED | OUTPATIENT
Start: 2022-10-11 | End: 2022-10-13

## 2022-10-11 RX ADMIN — HYDROMORPHONE HYDROCHLORIDE 0.5 MILLIGRAM(S): 2 INJECTION INTRAMUSCULAR; INTRAVENOUS; SUBCUTANEOUS at 00:13

## 2022-10-11 RX ADMIN — HYDROMORPHONE HYDROCHLORIDE 0.5 MILLIGRAM(S): 2 INJECTION INTRAMUSCULAR; INTRAVENOUS; SUBCUTANEOUS at 01:13

## 2022-10-11 RX ADMIN — HYDROMORPHONE HYDROCHLORIDE 2 MILLIGRAM(S): 2 INJECTION INTRAMUSCULAR; INTRAVENOUS; SUBCUTANEOUS at 14:20

## 2022-10-11 RX ADMIN — Medication 81 MILLIGRAM(S): at 11:55

## 2022-10-11 RX ADMIN — Medication 10 MILLIGRAM(S): at 05:24

## 2022-10-11 RX ADMIN — HYDROMORPHONE HYDROCHLORIDE 2 MILLIGRAM(S): 2 INJECTION INTRAMUSCULAR; INTRAVENOUS; SUBCUTANEOUS at 19:31

## 2022-10-11 RX ADMIN — TICAGRELOR 90 MILLIGRAM(S): 90 TABLET ORAL at 17:49

## 2022-10-11 RX ADMIN — HEPARIN SODIUM 5000 UNIT(S): 5000 INJECTION INTRAVENOUS; SUBCUTANEOUS at 05:24

## 2022-10-11 RX ADMIN — HYDROMORPHONE HYDROCHLORIDE 2 MILLIGRAM(S): 2 INJECTION INTRAMUSCULAR; INTRAVENOUS; SUBCUTANEOUS at 20:31

## 2022-10-11 RX ADMIN — HEPARIN SODIUM 5000 UNIT(S): 5000 INJECTION INTRAVENOUS; SUBCUTANEOUS at 17:40

## 2022-10-11 RX ADMIN — HYDROMORPHONE HYDROCHLORIDE 2 MILLIGRAM(S): 2 INJECTION INTRAMUSCULAR; INTRAVENOUS; SUBCUTANEOUS at 13:23

## 2022-10-11 RX ADMIN — TICAGRELOR 90 MILLIGRAM(S): 90 TABLET ORAL at 05:24

## 2022-10-11 NOTE — PROGRESS NOTE ADULT - SUBJECTIVE AND OBJECTIVE BOX
Patient is a 57y old  Female who presents with a chief complaint of acute dyspnea (10 Oct 2022 13:12)    INTERVAL HPI/OVERNIGHT EVENTS: no events     MEDICATIONS  (STANDING):  aspirin enteric coated 81 milliGRAM(s) Oral daily  heparin   Injectable 5000 Unit(s) SubCutaneous every 12 hours  metoprolol succinate ER 50 milliGRAM(s) Oral daily  predniSONE   Tablet 10 milliGRAM(s) Oral daily  ticagrelor 90 milliGRAM(s) Oral every 12 hours    MEDICATIONS  (PRN):  HYDROmorphone   Tablet 2 milliGRAM(s) Oral every 6 hours PRN Severe Pain (7 - 10)    Allergies    Dilantin (Rash)  erythromycin (Rash)    Intolerances      REVIEW OF SYSTEMS:  All other systems reviewed and are negative    Vital Signs Last 24 Hrs  T(C): 36.5 (11 Oct 2022 10:28), Max: 36.9 (10 Oct 2022 18:54)  T(F): 97.7 (11 Oct 2022 10:28), Max: 98.4 (10 Oct 2022 18:54)  HR: 70 (11 Oct 2022 10:28) (60 - 83)  BP: 123/70 (11 Oct 2022 10:28) (97/58 - 124/77)  BP(mean): --  RR: 18 (11 Oct 2022 10:28) (16 - 18)  SpO2: 100% (11 Oct 2022 10:28) (98% - 100%)    Parameters below as of 11 Oct 2022 10:28  Patient On (Oxygen Delivery Method): room air      Daily     Daily Weight in k (11 Oct 2022 05:08)  I&O's Summary    10 Oct 2022 07:  -  11 Oct 2022 07:00  --------------------------------------------------------  IN: 0 mL / OUT: 2500 mL / NET: -2500 mL    11 Oct 2022 07:01  -  11 Oct 2022 12:31  --------------------------------------------------------  IN: 277 mL / OUT: 0 mL / NET: 277 mL      CAPILLARY BLOOD GLUCOSE        PHYSICAL EXAM:  GENERAL: NAD,    HEAD:  Atraumatic, Normocephalic  EYES: EOMI, PERRLA, conjunctiva and sclera clear  ENMT: No tonsillar erythema, exudates, or enlargement; Moist mucous membranes, Good dentition, No lesions  NECK: Supple, No JVD, Normal thyroid  NERVOUS SYSTEM:  Alert & Oriented X3, Good concentration; Motor Strength 5/5 B/L upper and lower extremities; DTRs 2+ intact and symmetric  CHEST/LUNG: Clear to percussion bilaterally; No rales, rhonchi, wheezing, or rubs  HEART: Regular rate and rhythm; No murmurs, rubs, or gallops  ABDOMEN: Soft, Nontender, Nondistended; Bowel sounds present  EXTREMITIES:  2+ Peripheral Pulses, No clubbing, cyanosis, or edema  LYMPH: No lymphadenopathy noted  SKIN: No rashes or lesions    Labs                          15.0   10. )-----------( 106      ( 10 Oct 2022 03:50 )             48.3     10-10    142  |  108  |  45<H>  ----------------------------<  252<H>  4.5   |  20<L>  |  3.85<H>    Ca    8.9      10 Oct 2022 03:50    TPro  8.1  /  Alb  3.0<L>  /  TBili  1.0  /  DBili  x   /  AST  224<H>  /  ALT  24  /  AlkPhos  218<H>  10-10    PT/INR - ( 10 Oct 2022 03:50 )   PT: 11.2 sec;   INR: 0.93 ratio         PTT - ( 10 Oct 2022 03:50 )  PTT:25.2 sec      Urinalysis Basic - ( 10 Oct 2022 10:00 )    Color: Yellow / Appearance: very cloudy / S.015 / pH: x  Gluc: x / Ketone: Negative  / Bili: Negative / Urobili: Negative mg/dL   Blood: x / Protein: 100 mg/dL / Nitrite: Negative   Leuk Esterase: Moderate / RBC: 6-10 /HPF / WBC TNTC /HPF   Sq Epi: x / Non Sq Epi: Many / Bacteria: Moderate        Culture - Blood (collected 10 Oct 2022 05:00)  Source: .Blood Blood-Peripheral  Preliminary Report (11 Oct 2022 09:01):    No growth to date.    Culture - Blood (collected 10 Oct 2022 05:00)  Source: .Blood Blood-Peripheral  Preliminary Report (11 Oct 2022 09:01):    No growth to date.                DVT prophylaxis: > Lovenox 40mg SQ daily  > Heparin   > SCD's

## 2022-10-11 NOTE — PROGRESS NOTE ADULT - ASSESSMENT
Patient is a 57F with a PMH of PMH of CAD s/p PCI with stents, MI, CHF, HTN, lupus, ESRD on HD (m/w/f) who presents to the ED for acute dyspnea.  Patient states that throughout the day yesterday she was not feeling well - reports history of headache and fatigue.  Patient states that she woke up at 0300 this morning with severe shortness of breath.  Her dyspnea continued to persist for over ten minutes - called EMS.  When EMS arrived, they noted her SpO2 was in the 70s.  Placed on CPAP in the field, currently on BiPAP in ED.  Patient currently has no active complaints, comfortable on BiPAP.  Recently discharged from [facility] for similar complaints.  During that visit patient required urgent HD to resolve acute dyspnea.  Labs show lactic acidosis.

## 2022-10-11 NOTE — PROGRESS NOTE ADULT - SUBJECTIVE AND OBJECTIVE BOX
[facility] NEPHROLOGY SERVICES, [facility]  NEPHROLOGY AND HYPERTENSION  [address]  [address]  [place] [address]  [phone]    MD JESSICA RAY, MD TARA HERNANDEZ, MD TAVO CAT, MD RITA MEEKS, MD LEONA SOTO MD          Patient events noted  no distress    MEDICATIONS  (STANDING):  aspirin enteric coated 81 milliGRAM(s) Oral daily  heparin   Injectable 5000 Unit(s) SubCutaneous every 12 hours  metoprolol succinate ER 50 milliGRAM(s) Oral daily  predniSONE   Tablet 10 milliGRAM(s) Oral daily  ticagrelor 90 milliGRAM(s) Oral every 12 hours    MEDICATIONS  (PRN):  HYDROmorphone   Tablet 2 milliGRAM(s) Oral every 6 hours PRN Severe Pain (7 - 10)      10-10-22 @ 07:01  -  10-11-22 @ 07:00  --------------------------------------------------------  IN: 0 mL / OUT: 2500 mL / NET: -2500 mL    10-11-22 @ 07:01  -  10-11-22 @ 20:40  --------------------------------------------------------  IN: 1277 mL / OUT: 0 mL / NET: 1277 mL      PHYSICAL EXAM:      T(C): 36.6 (10-11-22 @ 15:15), Max: 36.8 (10-11-22 @ 00:28)  HR: 70 (10-11-22 @ 16:14) (60 - 73)  BP: 135/81 (10-11-22 @ 15:15) (103/65 - 135/81)  RR: 17 (10-11-22 @ 15:15) (16 - 18)  SpO2: 98% (10-11-22 @ 18:06) (97% - 100%)  Wt(kg): --  Lungs clear  Heart S1S2  Abd soft NT ND  Extremities:   tr edema                                    15.0   10.93 )-----------( 106      ( 10 Oct 2022 03:50 )             48.3     10-10    142  |  108  |  45<H>  ----------------------------<  252<H>  4.5   |  20<L>  |  3.85<H>    Ca    8.9      10 Oct 2022 03:50    TPro  8.1  /  Alb  3.0<L>  /  TBili  1.0  /  DBili  x   /  AST  224<H>  /  ALT  24  /  AlkPhos  218<H>  10-10    ABG - ( 10 Oct 2022 06:17 )  pH, Arterial: 7.36  pH, Blood: x     /  pCO2: 39    /  pO2: 110   / HCO3: 22    / Base Excess: -3.1  /  SaO2: 98.6              LIVER FUNCTIONS - ( 10 Oct 2022 03:50 )  Alb: 3.0 g/dL / Pro: 8.1 gm/dL / ALK PHOS: 218 U/L / ALT: 24 U/L / AST: 224 U/L / GGT: x           Creatinine Trend: 3.85<--, 3.85<--      Assessment   ESRD; fluid overload   Improved post HD    Plan:  HD for tomorrow   Echo today    Juan Darby MD

## 2022-10-12 LAB
ALBUMIN SERPL ELPH-MCNC: 2.6 G/DL — LOW (ref 3.3–5)
ALP SERPL-CCNC: 135 U/L — HIGH (ref 40–120)
ALT FLD-CCNC: <6 U/L — LOW (ref 12–78)
ANION GAP SERPL CALC-SCNC: 11 MMOL/L — SIGNIFICANT CHANGE UP (ref 5–17)
AST SERPL-CCNC: 20 U/L — SIGNIFICANT CHANGE UP (ref 15–37)
BILIRUB SERPL-MCNC: 0.6 MG/DL — SIGNIFICANT CHANGE UP (ref 0.2–1.2)
BUN SERPL-MCNC: 57 MG/DL — HIGH (ref 7–23)
CALCIUM SERPL-MCNC: 8.3 MG/DL — LOW (ref 8.5–10.1)
CHLORIDE SERPL-SCNC: 104 MMOL/L — SIGNIFICANT CHANGE UP (ref 96–108)
CO2 SERPL-SCNC: 24 MMOL/L — SIGNIFICANT CHANGE UP (ref 22–31)
CREAT SERPL-MCNC: 3.81 MG/DL — HIGH (ref 0.5–1.3)
EGFR: 13 ML/MIN/1.73M2 — LOW
GLUCOSE SERPL-MCNC: 78 MG/DL — SIGNIFICANT CHANGE UP (ref 70–99)
HCT VFR BLD CALC: 37.8 % — SIGNIFICANT CHANGE UP (ref 34.5–45)
HGB BLD-MCNC: 12.3 G/DL — SIGNIFICANT CHANGE UP (ref 11.5–15.5)
MCHC RBC-ENTMCNC: 32.5 G/DL — SIGNIFICANT CHANGE UP (ref 32–36)
MCHC RBC-ENTMCNC: 34.2 PG — HIGH (ref 27–34)
MCV RBC AUTO: 105 FL — HIGH (ref 80–100)
NRBC # BLD: 0 /100 WBCS — SIGNIFICANT CHANGE UP (ref 0–0)
PLATELET # BLD AUTO: 102 K/UL — LOW (ref 150–400)
POTASSIUM SERPL-MCNC: 3.9 MMOL/L — SIGNIFICANT CHANGE UP (ref 3.5–5.3)
POTASSIUM SERPL-SCNC: 3.9 MMOL/L — SIGNIFICANT CHANGE UP (ref 3.5–5.3)
PROT SERPL-MCNC: 6.8 GM/DL — SIGNIFICANT CHANGE UP (ref 6–8.3)
RBC # BLD: 3.6 M/UL — LOW (ref 3.8–5.2)
RBC # FLD: 15.6 % — HIGH (ref 10.3–14.5)
SODIUM SERPL-SCNC: 139 MMOL/L — SIGNIFICANT CHANGE UP (ref 135–145)
TROPONIN I, HIGH SENSITIVITY RESULT: 34 NG/L — SIGNIFICANT CHANGE UP
WBC # BLD: 7.09 K/UL — SIGNIFICANT CHANGE UP (ref 3.8–10.5)
WBC # FLD AUTO: 7.09 K/UL — SIGNIFICANT CHANGE UP (ref 3.8–10.5)

## 2022-10-12 PROCEDURE — 99233 SBSQ HOSP IP/OBS HIGH 50: CPT

## 2022-10-12 PROCEDURE — 71045 X-RAY EXAM CHEST 1 VIEW: CPT | Mod: 26

## 2022-10-12 PROCEDURE — 99223 1ST HOSP IP/OBS HIGH 75: CPT

## 2022-10-12 PROCEDURE — 78452 HT MUSCLE IMAGE SPECT MULT: CPT | Mod: 26

## 2022-10-12 RX ORDER — REGADENOSON 0.08 MG/ML
0.4 INJECTION, SOLUTION INTRAVENOUS ONCE
Refills: 0 | Status: DISCONTINUED | OUTPATIENT
Start: 2022-10-12 | End: 2022-10-13

## 2022-10-12 RX ORDER — ATORVASTATIN CALCIUM 80 MG/1
40 TABLET, FILM COATED ORAL AT BEDTIME
Refills: 0 | Status: DISCONTINUED | OUTPATIENT
Start: 2022-10-12 | End: 2022-10-13

## 2022-10-12 RX ADMIN — TICAGRELOR 90 MILLIGRAM(S): 90 TABLET ORAL at 05:14

## 2022-10-12 RX ADMIN — Medication 50 MILLIGRAM(S): at 05:14

## 2022-10-12 RX ADMIN — HYDROMORPHONE HYDROCHLORIDE 2 MILLIGRAM(S): 2 INJECTION INTRAMUSCULAR; INTRAVENOUS; SUBCUTANEOUS at 01:28

## 2022-10-12 RX ADMIN — HYDROMORPHONE HYDROCHLORIDE 2 MILLIGRAM(S): 2 INJECTION INTRAMUSCULAR; INTRAVENOUS; SUBCUTANEOUS at 02:28

## 2022-10-12 RX ADMIN — Medication 10 MILLIGRAM(S): at 05:14

## 2022-10-12 RX ADMIN — HYDROMORPHONE HYDROCHLORIDE 2 MILLIGRAM(S): 2 INJECTION INTRAMUSCULAR; INTRAVENOUS; SUBCUTANEOUS at 20:31

## 2022-10-12 RX ADMIN — Medication 81 MILLIGRAM(S): at 11:53

## 2022-10-12 RX ADMIN — ATORVASTATIN CALCIUM 40 MILLIGRAM(S): 80 TABLET, FILM COATED ORAL at 21:22

## 2022-10-12 RX ADMIN — HYDROMORPHONE HYDROCHLORIDE 2 MILLIGRAM(S): 2 INJECTION INTRAMUSCULAR; INTRAVENOUS; SUBCUTANEOUS at 21:00

## 2022-10-12 RX ADMIN — HYDROMORPHONE HYDROCHLORIDE 2 MILLIGRAM(S): 2 INJECTION INTRAMUSCULAR; INTRAVENOUS; SUBCUTANEOUS at 09:05

## 2022-10-12 RX ADMIN — HYDROMORPHONE HYDROCHLORIDE 2 MILLIGRAM(S): 2 INJECTION INTRAMUSCULAR; INTRAVENOUS; SUBCUTANEOUS at 07:55

## 2022-10-12 RX ADMIN — HYDROMORPHONE HYDROCHLORIDE 2 MILLIGRAM(S): 2 INJECTION INTRAMUSCULAR; INTRAVENOUS; SUBCUTANEOUS at 14:19

## 2022-10-12 RX ADMIN — HEPARIN SODIUM 5000 UNIT(S): 5000 INJECTION INTRAVENOUS; SUBCUTANEOUS at 05:15

## 2022-10-12 RX ADMIN — HYDROMORPHONE HYDROCHLORIDE 2 MILLIGRAM(S): 2 INJECTION INTRAMUSCULAR; INTRAVENOUS; SUBCUTANEOUS at 15:36

## 2022-10-12 NOTE — CONSULT NOTE ADULT - SUBJECTIVE AND OBJECTIVE BOX
CARDIOLOGY CONSULTATION NOTE                                                                             LYLA ERIC is a 57y Female with a PMH of PMH of CAD s/p PCI with stents, MI, CHF, HTN, lupus, ESRD on HD (m/w/f) who presents to the ED for acute dyspnea.  Patient states that throughout the day yesterday she was not feeling well - reports history of headache and fatigue.  Patient states that she woke up at 0300 this morning with severe shortness of breath.  Her dyspnea continued to persist for over ten minutes - called EMS.  When EMS arrived, they noted her SpO2 was in the 70s.  Placed on CPAP in the field, currently on BiPAP in ED.  Patient currently has no active complaints, comfortable on BiPAP.  Recently discharged from [facility] for similar complaints.  During that visit patient required urgent HD to resolve acute dyspnea.  Labs show lactic acidosis.     REVIEW OF SYSTEMS: -----------------------------  CONSTITUTIONAL: No fever, weight loss, or fatigue EYES: No eye pain, visual disturbances, or discharge ENMT:  No difficulty hearing, tinnitus, vertigo; No sinus or throat pain NECK: No pain or stiffness BREASTS: No pain, masses, or nipple discharge RESPIRATORY: No cough, wheezing, chills or hemoptysis; No shortness of breath CARDIOVASCULAR: See HPI GASTROINTESTINAL: No abdominal or epigastric pain. No nausea, vomiting, or hematemesis; No diarrhea or constipation. No melena or hematochezia. GENITOURINARY: No dysuria, frequency, hematuria, or incontinence NEUROLOGICAL: No headaches, memory loss, loss of strength, numbness, or tremors SKIN: No itching, burning, rashes, or lesions  LYMPH NODES: No enlarged glands ENDOCRINE: No heat or cold intolerance; No hair loss MUSCULOSKELETAL: No joint pain or swelling; No muscle, back, or extremity pain PSYCHIATRIC: No depression, anxiety, mood swings, or difficulty sleeping HEME/LYMPH: No easy bruising, or bleeding gums ALLERGY AND IMMUNOLOGIC: No hives or eczema  Home Medications: aspirin 81 mg oral tablet: 1 tab(s) orally once a day (10 Oct 2022 13:48) Brilinta (ticagrelor) 90 mg oral tablet: 1 tab(s) orally 2 times a day (10 Oct 2022 13:48) calcium acetate 667 mg oral capsule: 1 cap(s) orally once a day (10 Oct 2022 13:48) metoprolol succinate 50 mg oral tablet, extended release: 1 tab(s) orally once a day (10 Oct 2022 13:48) predniSONE 10 mg oral tablet: 1 tab(s) orally once a day (10 Oct 2022 13:48)   MEDICATIONS  (STANDING): aspirin enteric coated 81 milliGRAM(s) Oral daily heparin   Injectable 5000 Unit(s) SubCutaneous every 12 hours metoprolol succinate ER 50 milliGRAM(s) Oral daily predniSONE   Tablet 10 milliGRAM(s) Oral daily ticagrelor 90 milliGRAM(s) Oral every 12 hours   ALLERGIES: Dilantin (Rash) erythromycin (Rash)   FAMILY HISTORY: FH: HTN (hypertension)    PHYSICAL EXAMINATION: ----------------------------- T(C): 36.7 (10-12-22 @ 10:05), Max: 36.7 (10-12-22 @ 10:05) HR: 87 (10-12-22 @ 10:05) (68 - 87) BP: 122/74 (10-12-22 @ 10:05) (122/74 - 159/83) RR: 17 (10-12-22 @ 10:05) (17 - 18) SpO2: 97% (10-12-22 @ 10:05) (97% - 99%) Wt(kg): --  10-11 @ 07:  -  10-12 @ 07:00 -------------------------------------------------------- IN:   Oral Fluid: 1277 mL Total IN: 1277 mL  OUT: Total OUT: 0 mL  Total NET: 1277 mL   10-12 @ 07:01  -  10-12 @ 13:34 -------------------------------------------------------- IN:   Oral Fluid: 300 mL Total IN: 300 mL  OUT: Total OUT: 0 mL  Total NET: 300 mL      Constitutional: well developed, normal appearance, well groomed, well nourished, no deformities and no acute distress.  Eyes: the conjunctiva exhibited no abnormalities and the eyelids demonstrated no xanthelasmas.  HEENT: normal oral mucosa, no oral pallor and no oral cyanosis.  Neck: normal jugular venous A waves present, normal jugular venous V waves present and no jugular venous devine A waves.  Pulmonary: no respiratory distress, normal respiratory rhythm and effort, no accessory muscle use and lungs were clear to auscultation bilaterally.  Cardiovascular: heart rate and rhythm were normal, normal S1 and S2 and no murmur, gallop, rub, heave or thrill are present.  Abdomen: soft, non-tender, no hepato-splenomegaly and no abdominal mass palpated.  Musculoskeletal: the gait could not be assessed..  Extremities: no clubbing of the fingernails, no localized cyanosis, no petechial hemorrhages and no ischemic changes.  Skin: normal skin color and pigmentation, no rash, no venous stasis, no skin lesions, no skin ulcer and no xanthoma was observed.  Psychiatric: oriented to person, place, and time, the affect was normal, the mood was normal and not feeling anxious.   ECG: ------- < from: 12 Lead ECG (10.10.22 @ 03:48) >  Ventricular Rate 130 BPM  Atrial Rate 130 BPM  P-R Interval 144 ms  QRS Duration 78 ms  Q-T Interval 284 ms  QTC Calculation(Bazett) 417 ms  P Axis 75 degrees  R Axis -65 degrees  T Axis 86 degrees  Diagnosis Line Sinus tachycardia Biatrial enlargement Left anterior fascicular block Anteroseptal infarct (cited on or before 10-SEP-2022) Abnormal ECG When compared with ECG of 10-SEP-2022 23:55, No significant change was found Confirmed by Luis Alaniz MD ([id number]) on 10/10/2022 10:41:59 PM  < end of copied text >    LABS:  -------- 10-12  139  |  104  |  57<H> ----------------------------<  78 3.9   |  24  |  3.81<H>  Ca    8.3<L>      12 Oct 2022 05:50  TPro  6.8  /  Alb  2.6<L>  /  TBili  0.6  /  DBili  x   /  AST  20  /  ALT  <6<L>  /  AlkPhos  135<H>  10-12                       12.3  7.09  )-----------( 102      ( 12 Oct 2022 05:50 )            37.8          RADIOLOGY REPORTS: -----------------------------  < from: Xray Chest 1 View-PORTABLE IMMEDIATE (Xray Chest 1 View-PORTABLE IMMEDIATE .) (10.12.22 @ 09:01) >  ACC: [id number] EXAM:  XR CHEST PORTABLE IMMED 1V                        PROCEDURE DATE:  10/12/2022      INTERPRETATION:  INDICATION: Chest tightness  Chest one view  COMPARISON: October 10, 2022  FINDINGS: Heart/Vascular: The heart size, mediastinum, hilum and aorta are within  normal limits for projection. Pulmonary: Midline trachea. The pulmonary congestion has nearly resolved.  Small bilateral effusions with lower lobe consolidations.  Left upper extremity vascular stent. Bones: There is no fracture. Lines and catheter: IVC filter partially visualized.  Impression:  The pulmonary congestion has nearly resolved. Small bilateral effusions  with lower lobe consolidations.  --- End of Report ---      BRIAN HANDLEY DO; Attending Radiologist This document has been electronically signed. Oct 12 2022 12:08PM  < end of copied text >   ECHOCARDIOGRAM: --------------------------- < from: TTE Echo Complete w/o Contrast w/ Doppler (10.11.22 @ 15:22) >  ACC: [id number] EXAM:  ECHO TTE WO CON COMP W DOPP                        PROCEDURE DATE:  10/11/2022      INTERPRETATION:  TRANSTHORACIC ECHOCARDIOGRAM REPORT    Patient Name:   LYLA ERIC Patient Location: [facility] Rec #:  [id number]      Accession #:      [id number] Account #:                      Height:           61.8 in 157.0 cm YOB: 1965        Weight:           114.6 lb 52.00 kg Patient Age:    57 years        BSA:              1.51 m² Patient Gender: F            BP:               109/68 mmHg   Date of Exam:        10/11/2022 3:22:36 PM Sonographer:         MIGUE Referring Physician: PARAG CLARK  Procedure:     2D Echo/Doppler/Color Doppler Complete. Indications:   Abnormal electrocardiogram [ECG] [EKG] - R94.31 Diagnosis:     Abnormal electrocardiogram [ECG] [EKG] - R94.31 Study Details: Technically fair study.    2D AND M-MODE MEASUREMENTS (normal ranges within parentheses): Left                 Normal   Aorta/LeftNormal Ventricle:                    Atrium: IVSd (2D):    0.81  (0.7-1.1) Aortic Root    3.04  (2.4-3.7)                cm             (2D):           cm LVPWd (2D):   0.90  (0.7-1.1) Left Atrium    4.06  (1.9-4.0)                cm(2D):           cm LVIDd (2D):   5.42  (3.4-5.7) LA Vol Index   59.7                cm             (A4C):        ml/m² LVIDs (2D):   3.96            LA Vol Index   65.6                cm             (A2C):        ml/m² LV FS (2D):   27.1   (>25%)   LA Vol Index   63.1                 %             (BP):         ml/m² Relative Wall 0.33   (<0.42)  Right Thickness                     Ventricle:                               TAPSE:          1.69 cm  LV DIASTOLIC FUNCTION: MV Peak E: 0.83m/s Decel Time:  228 msec MV Peak A: 0.91 m/s Septal E/e'  15.8 E/A Ratio: 0.91     Lateral E/e' 13.5 Septal e'  0.1 m/s Lateral e' 0.1 m/s  SPECTRAL DOPPLER ANALYSIS (where applicable): Mitral Valve: MV P1/2 Time: 66.09 msec MV Area, PHT: 3.33 cm²  Aortic Valve: AoV Max Grant: 1.48 m/s AoV Peak P.7 mmHg AoV Mean PG:  3.6 mmHg  LVOT Vmax: 1.32 m/s LVOT VTI: 0.235 m LVOT Diameter: 2.06 cm  AoV Area, Vmax: 2.98 cm² AoV Area, VTI: 3.27 cm² AoV Area, Vmn: 2.93 cm²  Tricuspid Valveand PA/RV Systolic Pressure: TR Max Velocity: 2.44 m/s RA  Pressure: 5 mmHg RVSP/PASP: 28.8 mmHg   PHYSICIAN INTERPRETATION: Left Ventricle: The left ventricular internal cavity size is normal. Left  ventricular wall thickness is normal. Global LV systolic function was normal. Left ventricular ejection  fraction, by visual estimation, is 55 to 60%. Spectral Doppler shows  impaired relaxation pattern of left ventricular myocardial filling (Grade  I diastolic dysfunction). Right Ventricle: Normal right ventricular size and function. Left Atrium: Mildly enlarged left atrium. Right Atrium: Normal right atrial size. Pericardium: There is no evidence of pericardial effusion. Mitral Valve: Structurally normal mitral valve, with normalleaflet  excursion. Mitral leaflet mobility is normal. Mild mitral valve  regurgitation is seen. Tricuspid Valve: Structurally normal tricuspid valve, with normal leaflet  excursion. The tricuspid valve is normal in structure. Mild tricuspid  regurgitation is visualized. Aortic Valve: Normal trileaflet aortic valve with normal opening. The  aortic valve is trileaflet. No evidence of aortic valve regurgitation is  seen. Pulmonic Valve: The pulmonic valve was not well visualized. No indication  of pulmonic valve regurgitation. Aorta: Aortic root measured at Sinus of Valsalva is normal. Venous: The inferior vena cava was normal sized, with respiratory size  variation greater than 50%.   Summary:  1. Left ventricular ejection fraction, by visual estimation, is 55 to  60%.  2. Technically fair study.  3. Normal global left ventricular systolic function.  4. Normal left ventricular internal cavity size.  5. Spectral Doppler shows impaired relaxation pattern of left  ventricular myocardial filling (Grade I diastolic dysfunction).  6. Normal right ventricular size and function.  7. Mildly enlarged left atrium.  8. Normal right atrial size.  9. There is no evidence of pericardial effusion. 10. Mild mitral valve regurgitation. 11. Structurally normal mitral valve, with normal leaflet excursion. 12. Mild tricuspid regurgitation. 13. Normal trileaflet aortic valve with normal opening.   [id number] Anshul Orozco MD FACC, FASE, FACP Electronically signed on 10/11/2022 at 4:59:21 PM      *** Final ***  < end of copied text >

## 2022-10-12 NOTE — PROGRESS NOTE ADULT - PROBLEM SELECTOR PLAN 1
acute pulmonary edema, acute on chronic diastolic CHF  s/p hd improved saturation   nephro on board   s/p lasix IV     Lactic Acidosis resolved   Likely from hypertensive urgency - SBP >220 on arrival
acute pulmonary edema  s/p hd improved saturation   nephro on board   will get tte   s/p lasix IV     Lactic Acidosis resolved   Likely from hypertensive urgency - SBP >220 on arrival

## 2022-10-12 NOTE — CONSULT NOTE ADULT - ASSESSMENT
HFpEF in setting of CRF on HD.  Atypical chest pain.    The chart has been reviewed but the patient has not yet been examined.  Full note to follow.   HFpEF in setting of CRF on HD.  Atypical (musculoskeletal) chest pain.    h/o ASHD noted, will schedule nuclear stress test in AM.  On asa, bbl   Should be on statin.

## 2022-10-12 NOTE — PROGRESS NOTE ADULT - SUBJECTIVE AND OBJECTIVE BOX
Patient is a 57y old  Female who presents with a chief complaint of acute dyspnea (12 Oct 2022 13:33)      INTERVAL HPI/OVERNIGHT EVENTS:  Pt was seen and examined, no acute events.      MEDICATIONS  (STANDING):  aspirin enteric coated 81 milliGRAM(s) Oral daily  heparin   Injectable 5000 Unit(s) SubCutaneous every 12 hours  metoprolol succinate ER 50 milliGRAM(s) Oral daily  predniSONE   Tablet 10 milliGRAM(s) Oral daily  regadenoson Injectable 0.4 milliGRAM(s) IV Push once  ticagrelor 90 milliGRAM(s) Oral every 12 hours    MEDICATIONS  (PRN):  HYDROmorphone   Tablet 2 milliGRAM(s) Oral every 6 hours PRN Severe Pain (7 - 10)      Allergies  Dilantin (Rash)  erythromycin (Rash)      Vital Signs Last 24 Hrs  T(C): 36.7 (12 Oct 2022 15:45), Max: 36.7 (12 Oct 2022 10:05)  T(F): 98.1 (12 Oct 2022 15:45), Max: 98.1 (12 Oct 2022 10:05)  HR: 61 (12 Oct 2022 15:45) (61 - 87)  BP: 112/65 (12 Oct 2022 15:45) (112/65 - 159/83)  BP(mean): --  RR: 16 (12 Oct 2022 15:45) (16 - 18)  SpO2: 97% (12 Oct 2022 15:45) (97% - 99%)    Parameters below as of 12 Oct 2022 15:45  Patient On (Oxygen Delivery Method): room air        PHYSICAL EXAM:  GENERAL: NAD,    HEAD:  Atraumatic, Normocephalic  EYES: EOMI, PERRLA, conjunctiva and sclera clear  ENMT: No tonsillar erythema, exudates, or enlargement; Moist mucous membranes, Good dentition, No lesions  NECK: Supple, No JVD, Normal thyroid  NERVOUS SYSTEM:  Alert & Oriented X3, Good concentration; Motor Strength 5/5 B/L upper and lower extremities; DTRs 2+ intact and symmetric  CHEST/LUNG: Clear to percussion bilaterally; No rales, rhonchi, wheezing, or rubs  HEART: Regular rate and rhythm; No murmurs, rubs, or gallops  ABDOMEN: Soft, Nontender, Nondistended; Bowel sounds present  EXTREMITIES:  2+ Peripheral Pulses, No clubbing, cyanosis, or edema  LYMPH: No lymphadenopathy noted  SKIN: No rashes or lesions      LABS:                        12.3   7.09  )-----------( 102      ( 12 Oct 2022 05:50 )             37.8     10-12    139  |  104  |  57<H>  ----------------------------<  78  3.9   |  24  |  3.81<H>    Ca    8.3<L>      12 Oct 2022 05:50    TPro  6.8  /  Alb  2.6<L>  /  TBili  0.6  /  DBili  x   /  AST  20  /  ALT  <6<L>  /  AlkPhos  135<H>  10-12        CAPILLARY BLOOD GLUCOSE          Culture - Urine (collected 10 Oct 2022 13:10)  Source: Clean Catch Clean Catch (Midstream)  Final Report (11 Oct 2022 15:24):    >=3 organisms. Probable collection contamination.    Culture - Blood (collected 10 Oct 2022 05:00)  Source: .Blood Blood-Peripheral  Preliminary Report (11 Oct 2022 09:01):    No growth to date.    Culture - Blood (collected 10 Oct 2022 05:00)  Source: .Blood Blood-Peripheral  Preliminary Report (11 Oct 2022 09:01):    No growth to date.      RADIOLOGY & ADDITIONAL TESTS:    Imaging Personally Reviewed:  [ ] YES  [ ] NO    Consultant(s) Notes Reviewed:  [ ] YES  [ ] NO    Care Discussed with Consultants/Other Providers [ ] YES  [ ] NO

## 2022-10-12 NOTE — PROGRESS NOTE ADULT - PROBLEM SELECTOR PLAN 3
asa/brilinta  Add statin  Atypical CP today, neg trop, some lateral TWI in EKG  Stress test in am
asa/brilinta

## 2022-10-12 NOTE — PROGRESS NOTE ADULT - SUBJECTIVE AND OBJECTIVE BOX
[facility] NEPHROLOGY SERVICES, [facility]  NEPHROLOGY AND HYPERTENSION  [address]  [address]  [place] [address]  [phone]    MD JESSICA RAY, MD TARA HERNANDEZ, MD TAVO CAT, MD RITA MEEKS, MD LEONA SOTO MD          Patient events noted  NO distress    MEDICATIONS  (STANDING):  aspirin enteric coated 81 milliGRAM(s) Oral daily  atorvastatin 40 milliGRAM(s) Oral at bedtime  heparin   Injectable 5000 Unit(s) SubCutaneous every 12 hours  metoprolol succinate ER 50 milliGRAM(s) Oral daily  predniSONE   Tablet 10 milliGRAM(s) Oral daily  regadenoson Injectable 0.4 milliGRAM(s) IV Push once  ticagrelor 90 milliGRAM(s) Oral every 12 hours    MEDICATIONS  (PRN):  HYDROmorphone   Tablet 2 milliGRAM(s) Oral every 6 hours PRN Severe Pain (7 - 10)      10-11-22 @ 07:01  -  10-12-22 @ 07:00  --------------------------------------------------------  IN: 1277 mL / OUT: 0 mL / NET: 1277 mL    10-12-22 @ 07:01  -  10-12-22 @ 21:22  --------------------------------------------------------  IN: 300 mL / OUT: 2500 mL / NET: -2200 mL      PHYSICAL EXAM:      T(C): 36.3 (10-12-22 @ 20:16), Max: 36.7 (10-12-22 @ 10:05)  HR: 66 (10-12-22 @ 20:16) (61 - 87)  BP: 138/86 (10-12-22 @ 20:16) (112/65 - 159/83)  RR: 16 (10-12-22 @ 20:16) (16 - 18)  SpO2: 99% (10-12-22 @ 20:16) (97% - 100%)  Wt(kg): --  Lungs clear  Heart S1S2  Abd soft NT ND  Extremities:   tr edema                                    12.3   7.09  )-----------( 102      ( 12 Oct 2022 05:50 )             37.8     10-12    139  |  104  |  57<H>  ----------------------------<  78  3.9   |  24  |  3.81<H>    Ca    8.3<L>      12 Oct 2022 05:50    TPro  6.8  /  Alb  2.6<L>  /  TBili  0.6  /  DBili  x   /  AST  20  /  ALT  <6<L>  /  AlkPhos  135<H>  10-12      LIVER FUNCTIONS - ( 12 Oct 2022 05:50 )  Alb: 2.6 g/dL / Pro: 6.8 gm/dL / ALK PHOS: 135 U/L / ALT: <6 U/L / AST: 20 U/L / GGT: x           Creatinine Trend: 3.81<--, 3.85<--      Assessment   ESRD; fluid overload   Improved     Plan:  HD for today  UF as tolerated     Juan Darby MD

## 2022-10-12 NOTE — PROGRESS NOTE ADULT - ASSESSMENT
Patient is a 57F with a PMH of PMH of CAD s/p PCI with stents, MI, CHF, HTN, lupus, ESRD on HD (m/w/f) who presents to the ED for acute dyspnea.  Patient states that throughout the day yesterday she was not feeling well - reports history of headache and fatigue.  Patient states that she woke up at 0300 this morning with severe shortness of breath.  Her dyspnea continued to persist for over ten minutes - called EMS.  When EMS arrived, they noted her SpO2 was in the 70s.  Placed on CPAP in the field, currently on BiPAP in ED.  Patient currently has no active complaints, comfortable on BiPAP.  Recently discharged from [facility] for similar complaints.  During that visit patient required urgent HD to resolve acute dyspnea.  Labs show lactic acidosis.

## 2022-10-13 VITALS
HEART RATE: 80 BPM | SYSTOLIC BLOOD PRESSURE: 150 MMHG | TEMPERATURE: 98 F | RESPIRATION RATE: 18 BRPM | OXYGEN SATURATION: 98 % | DIASTOLIC BLOOD PRESSURE: 87 MMHG

## 2022-10-13 LAB
ALBUMIN SERPL ELPH-MCNC: 3.2 G/DL — LOW (ref 3.3–5)
ALP SERPL-CCNC: 141 U/L — HIGH (ref 40–120)
ALT FLD-CCNC: <6 U/L — LOW (ref 12–78)
ANION GAP SERPL CALC-SCNC: 12 MMOL/L — SIGNIFICANT CHANGE UP (ref 5–17)
AST SERPL-CCNC: 20 U/L — SIGNIFICANT CHANGE UP (ref 15–37)
BILIRUB SERPL-MCNC: 0.8 MG/DL — SIGNIFICANT CHANGE UP (ref 0.2–1.2)
BUN SERPL-MCNC: 41 MG/DL — HIGH (ref 7–23)
CALCIUM SERPL-MCNC: 8.7 MG/DL — SIGNIFICANT CHANGE UP (ref 8.5–10.1)
CHLORIDE SERPL-SCNC: 98 MMOL/L — SIGNIFICANT CHANGE UP (ref 96–108)
CHOLEST SERPL-MCNC: 266 MG/DL — HIGH
CO2 SERPL-SCNC: 25 MMOL/L — SIGNIFICANT CHANGE UP (ref 22–31)
CREAT SERPL-MCNC: 4.24 MG/DL — HIGH (ref 0.5–1.3)
EGFR: 11 ML/MIN/1.73M2 — LOW
GLUCOSE SERPL-MCNC: 91 MG/DL — SIGNIFICANT CHANGE UP (ref 70–99)
HCT VFR BLD CALC: 44.3 % — SIGNIFICANT CHANGE UP (ref 34.5–45)
HDLC SERPL-MCNC: 78 MG/DL — SIGNIFICANT CHANGE UP
HGB BLD-MCNC: 14 G/DL — SIGNIFICANT CHANGE UP (ref 11.5–15.5)
LIPID PNL WITH DIRECT LDL SERPL: 156 MG/DL — HIGH
MCHC RBC-ENTMCNC: 31.6 G/DL — LOW (ref 32–36)
MCHC RBC-ENTMCNC: 33.7 PG — SIGNIFICANT CHANGE UP (ref 27–34)
MCV RBC AUTO: 106.5 FL — HIGH (ref 80–100)
NON HDL CHOLESTEROL: 188 MG/DL — HIGH
NRBC # BLD: 0 /100 WBCS — SIGNIFICANT CHANGE UP (ref 0–0)
PLATELET # BLD AUTO: 132 K/UL — LOW (ref 150–400)
POTASSIUM SERPL-MCNC: 3.9 MMOL/L — SIGNIFICANT CHANGE UP (ref 3.5–5.3)
POTASSIUM SERPL-SCNC: 3.9 MMOL/L — SIGNIFICANT CHANGE UP (ref 3.5–5.3)
PROT SERPL-MCNC: 8.2 GM/DL — SIGNIFICANT CHANGE UP (ref 6–8.3)
RBC # BLD: 4.16 M/UL — SIGNIFICANT CHANGE UP (ref 3.8–5.2)
RBC # FLD: 15.3 % — HIGH (ref 10.3–14.5)
SODIUM SERPL-SCNC: 135 MMOL/L — SIGNIFICANT CHANGE UP (ref 135–145)
TRIGL SERPL-MCNC: 162 MG/DL — HIGH
WBC # BLD: 7.18 K/UL — SIGNIFICANT CHANGE UP (ref 3.8–10.5)
WBC # FLD AUTO: 7.18 K/UL — SIGNIFICANT CHANGE UP (ref 3.8–10.5)

## 2022-10-13 PROCEDURE — 78452 HT MUSCLE IMAGE SPECT MULT: CPT | Mod: 26

## 2022-10-13 PROCEDURE — 99232 SBSQ HOSP IP/OBS MODERATE 35: CPT | Mod: FS

## 2022-10-13 PROCEDURE — 99239 HOSP IP/OBS DSCHRG MGMT >30: CPT

## 2022-10-13 RX ORDER — FUROSEMIDE 40 MG
1 TABLET ORAL
Qty: 60 | Refills: 0
Start: 2022-10-13 | End: 2022-11-11

## 2022-10-13 RX ORDER — ATORVASTATIN CALCIUM 80 MG/1
1 TABLET, FILM COATED ORAL
Qty: 30 | Refills: 0
Start: 2022-10-13 | End: 2022-11-11

## 2022-10-13 RX ADMIN — HYDROMORPHONE HYDROCHLORIDE 2 MILLIGRAM(S): 2 INJECTION INTRAMUSCULAR; INTRAVENOUS; SUBCUTANEOUS at 20:49

## 2022-10-13 RX ADMIN — ATORVASTATIN CALCIUM 40 MILLIGRAM(S): 80 TABLET, FILM COATED ORAL at 21:02

## 2022-10-13 RX ADMIN — Medication 10 MILLIGRAM(S): at 06:03

## 2022-10-13 RX ADMIN — TICAGRELOR 90 MILLIGRAM(S): 90 TABLET ORAL at 17:06

## 2022-10-13 RX ADMIN — HYDROMORPHONE HYDROCHLORIDE 2 MILLIGRAM(S): 2 INJECTION INTRAMUSCULAR; INTRAVENOUS; SUBCUTANEOUS at 19:38

## 2022-10-13 RX ADMIN — HYDROMORPHONE HYDROCHLORIDE 2 MILLIGRAM(S): 2 INJECTION INTRAMUSCULAR; INTRAVENOUS; SUBCUTANEOUS at 14:20

## 2022-10-13 RX ADMIN — HYDROMORPHONE HYDROCHLORIDE 2 MILLIGRAM(S): 2 INJECTION INTRAMUSCULAR; INTRAVENOUS; SUBCUTANEOUS at 13:19

## 2022-10-13 RX ADMIN — HYDROMORPHONE HYDROCHLORIDE 2 MILLIGRAM(S): 2 INJECTION INTRAMUSCULAR; INTRAVENOUS; SUBCUTANEOUS at 03:30

## 2022-10-13 RX ADMIN — TICAGRELOR 90 MILLIGRAM(S): 90 TABLET ORAL at 06:02

## 2022-10-13 RX ADMIN — HYDROMORPHONE HYDROCHLORIDE 2 MILLIGRAM(S): 2 INJECTION INTRAMUSCULAR; INTRAVENOUS; SUBCUTANEOUS at 02:50

## 2022-10-13 RX ADMIN — Medication 81 MILLIGRAM(S): at 13:10

## 2022-10-13 RX ADMIN — HEPARIN SODIUM 5000 UNIT(S): 5000 INJECTION INTRAVENOUS; SUBCUTANEOUS at 06:01

## 2022-10-13 NOTE — PROGRESS NOTE ADULT - SUBJECTIVE AND OBJECTIVE BOX
NEPHROLOGY PROGRESS NOTE    CHIEF COMPLAINT:  ESRD    HPI:  Breathing well today.    ROS:  no CP    EXAM:  T(F): 98.3 (10-13-22 @ 05:32)  HR: 57 (10-13-22 @ 09:01)  BP: 115/75 (10-13-22 @ 05:32)  RR: 16 (10-13-22 @ 05:32)  SpO2: 96% (10-13-22 @ 05:32)    Conversant, in no apparent distress  Normal respiratory effort, lungs clear bilaterally  Heart RRR with no murmur, no peripheral edema         LABS                             14.0   7.18  )-----------( 132      ( 13 Oct 2022 06:48 )             44.3          10-13    135  |  98  |  41<H>  ----------------------------<  91  3.9   |  25  |  4.24<H>    Ca    8.7      13 Oct 2022 06:48    TPro  8.2  /  Alb  3.2<L>  /  TBili  0.8  /  DBili  x   /  AST  20  /  ALT  <6<L>  /  AlkPhos  141<H>  10-13           Assessment   ESRD; fluid overload   Recurrent episodes of heart failure    Plan:  HD tomorrow  Patient counseled to challenge her dry weight as outpatient

## 2022-10-13 NOTE — PROGRESS NOTE ADULT - NS ATTEND AMEND GEN_ALL_CORE FT
Stress test reviewed - old LAd territory MI, no ischemia.  Planning on following up with her cardiologist at [facility].  Meds reviewed with team.

## 2022-10-13 NOTE — DISCHARGE NOTE PROVIDER - NSDCCPCAREPLAN_GEN_ALL_CORE_FT
PRINCIPAL DISCHARGE DIAGNOSIS  Diagnosis: Acute pulmonary edema  Assessment and Plan of Treatment: Patient is a 57F with a PMH of PMH of CAD s/p PCI with stents, MI, CHF, HTN, lupus, ESRD on HD (m/w/f) who presents to the ED for acute dyspnea.  Patient states that throughout the day yesterday she was not feeling well - reports history of headache and fatigue.  Patient states that she woke up at 0300 this morning with severe shortness of breath.  Her dyspnea continued to persist for over ten minutes - called EMS.  When EMS arrived, they noted her SpO2 was in the 70s.  Placed on CPAP in the field, currently on BiPAP in ED.  Patient currently has no active complaints, comfortable on BiPAP.  Recently discharged from [facility] for similar complaints.  During that visit patient required urgent HD to resolve acute dyspnea.   Acute respiratory failure with hypoxia.   Acute pulmonary edema, acute on chronic diastolic CHF  s/p hd improved saturation, on NC now  nephro on board   S/p Lasix IV, will dc on PO daily Lasix     Lactic Acidosis resolved   Likely from hypertensive urgency - SBP >220 on arrival. BP improved    ESRD on HD  HD as scheduled.  CAD (coronary artery disease).   asa/brilinta  Added statin  Atypical CP, improved, neg trop, stress test with old MI, no reversible ischemia   HLD:  - Statin  Systemic lupus.   prednisone daily  Essential hypertension.   metoprolol.

## 2022-10-13 NOTE — DISCHARGE NOTE PROVIDER - HOSPITAL COURSE
Patient is a 57F with a PMH of PMH of CAD s/p PCI with stents, MI, CHF, HTN, lupus, ESRD on HD (m/w/f) who presents to the ED for acute dyspnea.  Patient states that throughout the day yesterday she was not feeling well - reports history of headache and fatigue.  Patient states that she woke up at 0300 this morning with severe shortness of breath.  Her dyspnea continued to persist for over ten minutes - called EMS.  When EMS arrived, they noted her SpO2 was in the 70s.  Placed on CPAP in the field, currently on BiPAP in ED.  Patient currently has no active complaints, comfortable on BiPAP.  Recently discharged from [facility] for similar complaints.  During that visit patient required urgent HD to resolve acute dyspnea.     Acute respiratory failure with hypoxia.   Acute pulmonary edema, acute on chronic diastolic CHF  s/p hd improved saturation, on NC now  nephro on board   S/p Lasix IV, will dc on PO daily Lasix     Lactic Acidosis resolved   Likely from hypertensive urgency - SBP >220 on arrival. BP improved      ESRD on HD  HD as scheduled.    CAD (coronary artery disease).   asa/brilinta  Added statin  Atypical CP, improved, neg trop, stress test with old MI, no reversible ischemia       HLD:  - Statin    Systemic lupus.   prednisone daily    Essential hypertension.   metoprolol.

## 2022-10-13 NOTE — DISCHARGE NOTE NURSING/CASE MANAGEMENT/SOCIAL WORK - NSDCPEFALRISK_GEN_ALL_CORE
For information on Fall & Injury Prevention, visit: [email or web address] OR  [email or web address] OR  https://www.cdc.gov/steadi/patient.html

## 2022-10-13 NOTE — DISCHARGE NOTE NURSING/CASE MANAGEMENT/SOCIAL WORK - PATIENT PORTAL LINK FT
You can access the FollowMyHealth Patient Portal offered by [facility] by registering at the following website: [email or web address]. By joining [employer]’s FollowMyHealth portal, you will also be able to view your health information using other applications (apps) compatible with our system.

## 2022-10-13 NOTE — PROGRESS NOTE ADULT - SUBJECTIVE AND OBJECTIVE BOX
Patient is a 57y old  Female who presents with a chief complaint of acute dyspnea (12 Oct 2022 21:22)    PAST MEDICAL & SURGICAL HISTORY:  End stage renal disease on HD    Hypertension    Lupus    CAD (coronary artery disease) s/p PCI    INTERVAL HISTORY: in no acute distress   	  MEDICATIONS:  MEDICATIONS  (STANDING):  aspirin enteric coated 81 milliGRAM(s) Oral daily  atorvastatin 40 milliGRAM(s) Oral at bedtime  heparin   Injectable 5000 Unit(s) SubCutaneous every 12 hours  metoprolol succinate ER 50 milliGRAM(s) Oral daily  predniSONE   Tablet 10 milliGRAM(s) Oral daily  ticagrelor 90 milliGRAM(s) Oral every 12 hours    MEDICATIONS  (PRN):  HYDROmorphone   Tablet 2 milliGRAM(s) Oral every 6 hours PRN Severe Pain (7 - 10)    Vitals:  T(F): 98.3 (10-13-22 @ 05:32), Max: 98.3 (10-13-22 @ 05:32)  HR: 57 (10-13-22 @ 09:01) (57 - 66)  BP: 115/75 (10-13-22 @ 05:32) (112/65 - 149/78)  RR: 16 (10-13-22 @ 05:32) (16 - 18)  SpO2: 96% (10-13-22 @ 05:32) (96% - 100%)    10-12 @ 07:01  -  10-13 @ 07:00  --------------------------------------------------------  IN:    Oral Fluid: 300 mL  Total IN: 300 mL    OUT:    Other (mL): 2500 mL  Total OUT: 2500 mL    Total NET: -2200 mL    PHYSICAL EXAM:  Neuro: Awake, responsive  CV: S1 S2 RRR  Lungs: CTABL  GI: Soft, BS +, ND, NT  Extremities: No edema    TELEMETRY: sinus    RADIOLOGY: < from: Xray Chest 1 View-PORTABLE IMMEDIATE (Xray Chest 1 View-PORTABLE IMMEDIATE .) (10.12.22 @ 09:01) >    The pulmonary congestion has nearly resolved. Small bilateral effusions   with lower lobe consolidations.    < end of copied text >    DIAGNOSTIC TESTING:     [x ] Echocardiogram: < from: TTE Echo Complete w/o Contrast w/ Doppler (10.11.22 @ 15:22) >  Left Ventricle: The left ventricular internal cavity size is normal. Left   ventricular wall thickness is normal.  Global LV systolic function was normal. Left ventricular ejection   fraction, by visual estimation, is 55 to 60%. Spectral Doppler shows   impaired relaxation pattern of left ventricular myocardial filling (Grade   I diastolic dysfunction).  Right Ventricle: Normal right ventricular size and function.  Left Atrium: Mildly enlarged left atrium.  Right Atrium: Normal right atrial size.  Pericardium: There is no evidence of pericardial effusion.  Mitral Valve: Structurally normal mitral valve, with normalleaflet   excursion. Mitral leaflet mobility is normal. Mild mitral valve   regurgitation is seen.  Tricuspid Valve: Structurally normal tricuspid valve, with normal leaflet   excursion. The tricuspid valve is normal in structure. Mild tricuspid   regurgitation is visualized.  Aortic Valve: Normal trileaflet aortic valve with normal opening. The   aortic valve is trileaflet. No evidence of aortic valve regurgitation is   seen.  Pulmonic Valve: The pulmonic valve was not well visualized. No indication   of pulmonic valve regurgitation.  Aorta: Aortic root measured at Sinus of Valsalva is normal.  Venous: The inferior vena cava was normal sized, with respiratory size   variation greater than 50%.      Summary:   1. Left ventricular ejection fraction, by visual estimation, is 55 to   60%.   2. Technically fair study.   3. Normal global left ventricular systolic function.   4. Normal left ventricular internal cavity size.   5. Spectral Doppler shows impaired relaxation pattern of left   ventricular myocardial filling (Grade I diastolic dysfunction).   6. Normal right ventricular size and function.   7. Mildly enlarged left atrium.   8. Normal right atrial size.   9. There is no evidence of pericardial effusion.  10. Mild mitral valve regurgitation.  11. Structurally normal mitral valve, with normal leaflet excursion.  12. Mild tricuspid regurgitation.  13. Normal trileaflet aortic valve with normal opening.    < end of copied text >    [ x] Stress Test:      LABS:	 	    CARDIAC MARKERS:  Troponin I, High Sensitivity Result: 34.0 ng/L (10-12 @ 15:25)    13 Oct 2022 06:48    135    |  98     |  41     ----------------------------<  91     3.9     |  25     |  4.24   12 Oct 2022 05:50    139    |  104    |  57     ----------------------------<  78     3.9     |  24     |  3.81     Ca    8.7        13 Oct 2022 06:48    TPro  8.2    /  Alb  3.2    /  TBili  0.8    /  DBili  x      /  AST  20     /  ALT  <6     /  AlkPhos  141    13 Oct 2022 06:48                        14.0   7.18  )-----------( 132      ( 13 Oct 2022 06:48 )             44.3 ,                       12.3   7.09  )-----------( 102      ( 12 Oct 2022 05:50 )             37.8                Patient is a 57y old  Female who presents with a chief complaint of acute dyspnea (12 Oct 2022 21:22)    PAST MEDICAL & SURGICAL HISTORY:  End stage renal disease on HD    Hypertension    Lupus    CAD (coronary artery disease) s/p PCI    INTERVAL HISTORY: in no acute distress   	  MEDICATIONS:  MEDICATIONS  (STANDING):  aspirin enteric coated 81 milliGRAM(s) Oral daily  atorvastatin 40 milliGRAM(s) Oral at bedtime  heparin   Injectable 5000 Unit(s) SubCutaneous every 12 hours  metoprolol succinate ER 50 milliGRAM(s) Oral daily  predniSONE   Tablet 10 milliGRAM(s) Oral daily  ticagrelor 90 milliGRAM(s) Oral every 12 hours    MEDICATIONS  (PRN):  HYDROmorphone   Tablet 2 milliGRAM(s) Oral every 6 hours PRN Severe Pain (7 - 10)    Vitals:  T(F): 98.3 (10-13-22 @ 05:32), Max: 98.3 (10-13-22 @ 05:32)  HR: 57 (10-13-22 @ 09:01) (57 - 66)  BP: 115/75 (10-13-22 @ 05:32) (112/65 - 149/78)  RR: 16 (10-13-22 @ 05:32) (16 - 18)  SpO2: 96% (10-13-22 @ 05:32) (96% - 100%)    10-12 @ 07:01  -  10-13 @ 07:00  --------------------------------------------------------  IN:    Oral Fluid: 300 mL  Total IN: 300 mL    OUT:    Other (mL): 2500 mL  Total OUT: 2500 mL    Total NET: -2200 mL    PHYSICAL EXAM:  Neuro: Awake, responsive  CV: S1 S2 RRR  Lungs: CTABL  GI: Soft, BS +, ND, NT  Extremities: No edema    TELEMETRY: sinus    RADIOLOGY: < from: Xray Chest 1 View-PORTABLE IMMEDIATE (Xray Chest 1 View-PORTABLE IMMEDIATE .) (10.12.22 @ 09:01) >    The pulmonary congestion has nearly resolved. Small bilateral effusions   with lower lobe consolidations.    < end of copied text >    DIAGNOSTIC TESTING:     [x ] Echocardiogram: < from: TTE Echo Complete w/o Contrast w/ Doppler (10.11.22 @ 15:22) >  Left Ventricle: The left ventricular internal cavity size is normal. Left   ventricular wall thickness is normal.  Global LV systolic function was normal. Left ventricular ejection   fraction, by visual estimation, is 55 to 60%. Spectral Doppler shows   impaired relaxation pattern of left ventricular myocardial filling (Grade   I diastolic dysfunction).  Right Ventricle: Normal right ventricular size and function.  Left Atrium: Mildly enlarged left atrium.  Right Atrium: Normal right atrial size.  Pericardium: There is no evidence of pericardial effusion.  Mitral Valve: Structurally normal mitral valve, with normalleaflet   excursion. Mitral leaflet mobility is normal. Mild mitral valve   regurgitation is seen.  Tricuspid Valve: Structurally normal tricuspid valve, with normal leaflet   excursion. The tricuspid valve is normal in structure. Mild tricuspid   regurgitation is visualized.  Aortic Valve: Normal trileaflet aortic valve with normal opening. The   aortic valve is trileaflet. No evidence of aortic valve regurgitation is   seen.  Pulmonic Valve: The pulmonic valve was not well visualized. No indication   of pulmonic valve regurgitation.  Aorta: Aortic root measured at Sinus of Valsalva is normal.  Venous: The inferior vena cava was normal sized, with respiratory size   variation greater than 50%.      Summary:   1. Left ventricular ejection fraction, by visual estimation, is 55 to   60%.   2. Technically fair study.   3. Normal global left ventricular systolic function.   4. Normal left ventricular internal cavity size.   5. Spectral Doppler shows impaired relaxation pattern of left   ventricular myocardial filling (Grade I diastolic dysfunction).   6. Normal right ventricular size and function.   7. Mildly enlarged left atrium.   8. Normal right atrial size.   9. There is no evidence of pericardial effusion.  10. Mild mitral valve regurgitation.  11. Structurally normal mitral valve, with normal leaflet excursion.  12. Mild tricuspid regurgitation.  13. Normal trileaflet aortic valve with normal opening.    < end of copied text >    [ x] Stress Test:    < from: NM Pharm Stress Test, Dual Isotope (10.13.22 @ 12:00) >    1. There is scintigraphic evidence for an old myocardial infarct in the   LAD territory with no ischemia noted.    2. There is overall diminished left ventricular contractility, with wall   motion abnormality as described above. Overall post stress ejection   fraction was 47%  Negative EKG changes for stress induced myocardial   ischemia.  < end of copied text >    LABS:	 	    CARDIAC MARKERS:  Troponin I, High Sensitivity Result: 34.0 ng/L (10-12 @ 15:25)    13 Oct 2022 06:48    135    |  98     |  41     ----------------------------<  91     3.9     |  25     |  4.24   12 Oct 2022 05:50    139    |  104    |  57     ----------------------------<  78     3.9     |  24     |  3.81     Ca    8.7        13 Oct 2022 06:48    TPro  8.2    /  Alb  3.2    /  TBili  0.8    /  DBili  x      /  AST  20     /  ALT  <6     /  AlkPhos  141    13 Oct 2022 06:48                        14.0   7.18  )-----------( 132      ( 13 Oct 2022 06:48 )             44.3 ,                       12.3   7.09  )-----------( 102      ( 12 Oct 2022 05:50 )             37.8

## 2022-10-13 NOTE — PROGRESS NOTE ADULT - ASSESSMENT
57F with a PMH of PMH of CAD s/p PCI with stents, MI, CHF, HTN, lupus, ESRD on HD (m/w/f) who presents to the ED for acute dyspnea and fatigue, SpO2 was in the 70s as per EMS, requiring BiPAP and HD.  Recently discharged from [facility] for similar complaints  · /122 in ED    ·	Acute respiratory failure with hypoxia.  ·	ESRD  ·	CAD hx, s/p PCI  ·	SLE     -cont on tele  -cont on asa, bbl, statin  -HD as per renal  -daily prednisone   -nuclear stress test          57F with a PMH of PMH of CAD s/p PCI with stents, MI, CHF, HTN, lupus, ESRD on HD (m/w/f) who presents to the ED for acute dyspnea and fatigue, SpO2 was in the 70s as per EMS, requiring BiPAP and HD.  Recently discharged from [facility] for similar complaints  · /122 in ED  c/o chest pain 10/12    ·	Acute respiratory failure with hypoxia.  ·	ESRD  ·	CAD hx, s/p PCI  ·	SLE     -cont on tele  -cont on asa, bbl, statin  -HD as per renal  -daily prednisone   -nuclear stress test          57F with a PMH of PMH of CAD s/p PCI with stents, MI, CHF, HTN, lupus, ESRD on HD (m/w/f) who presents to the ED for acute dyspnea and fatigue, SpO2 was in the 70s as per EMS, requiring BiPAP and HD.  Recently discharged from [facility] for similar complaints  · /122 in ED  c/o chest pain 10/12 - reproducible, c/o generalized pain today    ·	Acute respiratory failure with hypoxia.  ·	Atypical chest pain  ·	ESRD  ·	CAD hx, s/p PCI  ·	SLE     -cont on asa, bbl, statin  -HD as per renal  -daily prednisone   -TTE unremarkable  -nuclear stress test:  There is scintigraphic evidence for an old myocardial infarct in the LAD territory with no ischemia noted, EF 47%  -pt to follow up with her cardiologist at [facility] for further management. Copy of Stress test and TTE report o pt for follow up with her cardiologist

## 2022-10-13 NOTE — DISCHARGE NOTE PROVIDER - CARE PROVIDER_API CALL
[facility]   Phone: (   )    -  Fax: (   )    -  Follow Up Time:

## 2022-10-13 NOTE — DISCHARGE NOTE PROVIDER - ATTENDING DISCHARGE PHYSICAL EXAMINATION:
Vital Signs Last 24 Hrs  T(C): 36.9 (13 Oct 2022 13:56), Max: 36.9 (13 Oct 2022 13:56)  T(F): 98.5 (13 Oct 2022 13:56), Max: 98.5 (13 Oct 2022 13:56)  HR: 101 (13 Oct 2022 13:56) (57 - 101)  BP: 136/93 (13 Oct 2022 13:56) (115/75 - 138/86)  BP(mean): --  RR: 17 (13 Oct 2022 13:56) (16 - 17)  SpO2: 97% (13 Oct 2022 13:56) (96% - 100%)    Parameters below as of 13 Oct 2022 13:56  Patient On (Oxygen Delivery Method): room air  GENERAL: NAD,    HEAD:  Atraumatic, Normocephalic  EYES: EOMI, PERRLA, conjunctiva and sclera clear  ENMT: No tonsillar erythema, exudates, or enlargement; Moist mucous membranes, Good dentition, No lesions  NECK: Supple, No JVD, Normal thyroid  NERVOUS SYSTEM:  Alert & Oriented X3, Good concentration; Motor Strength 5/5 B/L upper and lower extremities; DTRs 2+ intact and symmetric  CHEST/LUNG: Clear to percussion bilaterally; No rales, rhonchi, wheezing, or rubs  HEART: Regular rate and rhythm; No murmurs, rubs, or gallops  ABDOMEN: Soft, Nontender, Nondistended; Bowel sounds present  EXTREMITIES:  2+ Peripheral Pulses, No clubbing, cyanosis, or edema  LYMPH: No lymphadenopathy noted  SKIN: No rashes or lesions

## 2022-10-15 LAB
CULTURE RESULTS: SIGNIFICANT CHANGE UP
CULTURE RESULTS: SIGNIFICANT CHANGE UP
SPECIMEN SOURCE: SIGNIFICANT CHANGE UP
SPECIMEN SOURCE: SIGNIFICANT CHANGE UP

## 2022-10-19 DIAGNOSIS — R06.00 DYSPNEA, UNSPECIFIED: ICD-10-CM

## 2022-10-19 DIAGNOSIS — I25.10 ATHEROSCLEROTIC HEART DISEASE OF NATIVE CORONARY ARTERY WITHOUT ANGINA PECTORIS: ICD-10-CM

## 2022-10-19 DIAGNOSIS — Z79.82 LONG TERM (CURRENT) USE OF ASPIRIN: ICD-10-CM

## 2022-10-19 DIAGNOSIS — J96.01 ACUTE RESPIRATORY FAILURE WITH HYPOXIA: ICD-10-CM

## 2022-10-19 DIAGNOSIS — Z95.5 PRESENCE OF CORONARY ANGIOPLASTY IMPLANT AND GRAFT: ICD-10-CM

## 2022-10-19 DIAGNOSIS — N18.6 END STAGE RENAL DISEASE: ICD-10-CM

## 2022-10-19 DIAGNOSIS — Z88.1 ALLERGY STATUS TO OTHER ANTIBIOTIC AGENTS STATUS: ICD-10-CM

## 2022-10-19 DIAGNOSIS — I13.2 HYPERTENSIVE HEART AND CHRONIC KIDNEY DISEASE WITH HEART FAILURE AND WITH STAGE 5 CHRONIC KIDNEY DISEASE, OR END STAGE RENAL DISEASE: ICD-10-CM

## 2022-10-19 DIAGNOSIS — R07.89 OTHER CHEST PAIN: ICD-10-CM

## 2022-10-19 DIAGNOSIS — Z79.52 LONG TERM (CURRENT) USE OF SYSTEMIC STEROIDS: ICD-10-CM

## 2022-10-19 DIAGNOSIS — I50.33 ACUTE ON CHRONIC DIASTOLIC (CONGESTIVE) HEART FAILURE: ICD-10-CM

## 2022-10-19 DIAGNOSIS — I16.0 HYPERTENSIVE URGENCY: ICD-10-CM

## 2022-10-19 DIAGNOSIS — Z99.2 DEPENDENCE ON RENAL DIALYSIS: ICD-10-CM

## 2022-10-19 DIAGNOSIS — I25.2 OLD MYOCARDIAL INFARCTION: ICD-10-CM

## 2022-10-19 DIAGNOSIS — E87.20 ACIDOSIS, UNSPECIFIED: ICD-10-CM

## 2022-10-19 DIAGNOSIS — M32.9 SYSTEMIC LUPUS ERYTHEMATOSUS, UNSPECIFIED: ICD-10-CM

## 2023-02-08 NOTE — PATIENT PROFILE ADULT - HEALTH LITERACY
Monitor placed by Yanira Chaves LPN on 2/8/2023. The patient was instructed and understands monitor use.    Does the patient have a pacemaker? No.    Holter Monitor serial #: [id number]    Time to be monitored: 48 hrs.   no